# Patient Record
Sex: MALE | Race: WHITE | NOT HISPANIC OR LATINO | Employment: OTHER | URBAN - METROPOLITAN AREA
[De-identification: names, ages, dates, MRNs, and addresses within clinical notes are randomized per-mention and may not be internally consistent; named-entity substitution may affect disease eponyms.]

---

## 2023-06-26 ENCOUNTER — PATIENT OUTREACH (OUTPATIENT)
Dept: PRIMARY CARE | Facility: CLINIC | Age: 68
End: 2023-06-26
Payer: MEDICARE

## 2023-06-26 DIAGNOSIS — J44.1 COPD EXACERBATION (MULTI): ICD-10-CM

## 2023-06-26 RX ORDER — PREDNISONE 20 MG/1
20 TABLET ORAL DAILY
COMMUNITY
End: 2023-07-17 | Stop reason: ALTCHOICE

## 2023-06-26 RX ORDER — IPRATROPIUM BROMIDE 0.5 MG/2.5ML
0.5 SOLUTION RESPIRATORY (INHALATION)
COMMUNITY
End: 2023-07-17 | Stop reason: ALTCHOICE

## 2023-06-26 NOTE — PROGRESS NOTES
Discharge Facility:[facility]  Discharge Diagnosis:COPD Exacerbation   Admission Date:6/19/23  Discharge Date: 6/24/23    PCP Appointment Date:Patient requested to only follow up with Pulmonology  Specialist Appointment Date: Pulmonology  Hospital Encounter and Summary: Linked See discharge assessment below for further details  Engagement  Call Start Time: 1040 (6/26/2023 11:06 AM)    Medications  Medications reviewed with patient/caregiver?: Yes (6/26/2023 11:06 AM)  Is the patient having any side effects they believe may be caused by any medication additions or changes?: No (6/26/2023 11:06 AM)  Does the patient have all medications ordered at discharge?: Yes (Patient stated that he is taking prednisone 20 mg and albuterol inhaler) (6/26/2023 11:06 AM)  Prescription Comments: see med list (6/26/2023 11:06 AM)  Medication Comments: see med list (6/26/2023 11:06 AM)    Appointments  Does the patient have a primary care provider?: Yes (6/26/2023 11:06 AM)  Care Management Interventions: -- (Patient reported following up with Specialty Doctor) (6/26/2023 11:06 AM)  Has the patient kept scheduled appointments due by today?: Yes (6/26/2023 11:06 AM)  Care Management Interventions: Advised to schedule with specialist (patient will follow up with Pulmonology) (6/26/2023 11:06 AM)    Self Management  What Durable Medical Equipment (DME) was ordered?: Oxygen Canister PRN as needed. (6/26/2023 11:06 AM)  Has all Durable Medical Equipment (DME) been delivered?: Yes (6/26/2023 11:06 AM)    Patient Teaching  Does the patient have access to their discharge instructions?: Yes (6/26/2023 11:06 AM)  Care Management Interventions: Reviewed instructions with patient (6/26/2023 11:06 AM)  What is the patient's perception of their health status since discharge?: Improving (6/26/2023 11:06 AM)  Is the patient/caregiver able to teach back the hierarchy of who to call/visit for symptoms/problems? PCP, Specialist, Home Health nurse,  Urgent Care, ED, 911: Yes (6/26/2023 11:06 AM)

## 2023-07-07 DIAGNOSIS — J44.9 CHRONIC OBSTRUCTIVE PULMONARY DISEASE, UNSPECIFIED COPD TYPE (MULTI): Primary | ICD-10-CM

## 2023-07-17 ENCOUNTER — TELEMEDICINE (OUTPATIENT)
Dept: PHARMACY | Facility: HOSPITAL | Age: 68
End: 2023-07-17
Payer: MEDICARE

## 2023-07-17 DIAGNOSIS — J44.9 CHRONIC OBSTRUCTIVE PULMONARY DISEASE, UNSPECIFIED COPD TYPE (MULTI): ICD-10-CM

## 2023-07-17 PROBLEM — M06.9 RHEUMATOID ARTHRITIS (MULTI): Status: ACTIVE | Noted: 2023-07-17

## 2023-07-17 RX ORDER — FLUTICASONE FUROATE, UMECLIDINIUM BROMIDE AND VILANTEROL TRIFENATATE 200; 62.5; 25 UG/1; UG/1; UG/1
1 POWDER RESPIRATORY (INHALATION)
COMMUNITY
Start: 2023-07-06 | End: 2023-10-16 | Stop reason: SDUPTHER

## 2023-07-17 RX ORDER — HYDROXYCHLOROQUINE SULFATE 200 MG/1
1 TABLET, FILM COATED ORAL DAILY
COMMUNITY
Start: 2014-09-09 | End: 2023-12-01

## 2023-07-17 RX ORDER — IPRATROPIUM BROMIDE AND ALBUTEROL SULFATE 2.5; .5 MG/3ML; MG/3ML
3 SOLUTION RESPIRATORY (INHALATION)
COMMUNITY
Start: 2023-06-23

## 2023-07-17 RX ORDER — FOLIC ACID 1 MG/1
1 TABLET ORAL DAILY
COMMUNITY
Start: 2014-04-07

## 2023-07-17 RX ORDER — ALBUTEROL SULFATE 90 UG/1
2 AEROSOL, METERED RESPIRATORY (INHALATION) EVERY 4 HOURS PRN
COMMUNITY
Start: 2022-07-21 | End: 2023-12-01 | Stop reason: SDUPTHER

## 2023-07-17 NOTE — PROGRESS NOTES
Pharmacy Post-Discharge Visit  Felix Mancera is a 68 y.o. male was referred to Clinical Pharmacy Team to complete a post-discharge medication optimization and monitoring visit.  The patient was referred for their COPD.    Admission Date: 6/19/23  Discharge Date: 6/24/23    Referring Provider: Terrence Farley MD    Subjective   No Known Allergies  No Pharmacies Listed    Social History     Social History Narrative    Not on file        Notable Medication changes following discharge:  Start:   Trelegy 200-62.5-25 mcg inhaler 1 puff daily  Duoneb solution 3-4 times daily as needed  Stop:   Trelegy 100-62.5-25 mcg inhaler  Albuterol nebulizer solution 3-4 times daily as needed    HPI  COPD ASSESSMENT    Rescue Inhaler Use:  -How many times per week do you use your rescue inhaler? 3-4x/week (since Trelegy dose increase)  -How many times per week do you use your nebulizer solution? Couple times per week (since Trelegy dose increase)    Inhaler Technique:  -How do you use your rescue inhaler?  --describes appropriate technique; only waits 10 seconds between puffs, advised to wait about 1 min    -How do you use your maintenance inhaler?  --describes appropriate technique, rinses mouth after use    Secondary Prevention (vaccines):  -Influenza: Fall 2023  -COVID: up to date  -PPSV23/PCV20 (unsure which): Fall 2023    Sx Management:  -Increased cough? no  -Increased sputum production? no  -Increased SOB? no    Exacerbation Hx:  -When was your last hospitalization for an exacerbation? 6/19/23  -When was the last time you were treated with antibiotics and/or steroids? 6/19/23 (prednisone given at discharge - finished treatment)    Going to start pulmonary rehab 7/25/23  Saw pulmonologist last week; next appointment 10/2023    Review of Systems    Objective     There were no vitals taken for this visit.     LAB  Lab Results   Component Value Date    BILITOT 0.6 06/19/2023    CALCIUM 9.6 06/24/2023    CO2 34 (H) 06/24/2023     CL 98 06/24/2023    CREATININE 0.78 06/24/2023    GLUCOSE 108 (H) 06/24/2023    ALKPHOS 32 (L) 06/19/2023    K 5.0 06/24/2023    PROT 7.4 06/19/2023     06/24/2023    AST 43 (H) 06/19/2023    ALT 32 06/19/2023    BUN 29 (H) 06/24/2023    ANIONGAP 13 06/24/2023    MG 2.33 06/24/2023    PHOS 3.6 06/24/2023    ALBUMIN 3.5 06/24/2023    GFRMALE >90 06/24/2023     Lab Results   Component Value Date    TRIG 90 06/08/2022    CHOL 236 (H) 06/08/2022    HDL 56.8 06/08/2022     Lab Results   Component Value Date    HGBA1C 5.9 (A) 06/20/2022         Current Outpatient Medications on File Prior to Visit   Medication Sig Dispense Refill    albuterol 90 mcg/actuation inhaler Inhale 2 puffs every 4 hours if needed.      folic acid (Folvite) 1 mg tablet Take 1 tablet (1 mg) by mouth once daily.      hydroxychloroquine (Plaquenil) 200 mg tablet Take 1 tablet (200 mg) by mouth once daily.      ipratropium-albuteroL (Duo-Neb) 0.5-2.5 mg/3 mL nebulizer solution Take 3 mL by nebulization 4 times a day. As needed      Trelegy Ellipta 200-62.5-25 mcg blister with device Inhale 1 puff once daily.      [DISCONTINUED] ipratropium (Atrovent) 0.02 % nebulizer solution Take 0.5 mg by nebulization 4 times a day.      [DISCONTINUED] predniSONE (Deltasone) 20 mg tablet Take 1 tablet (20 mg) by mouth once daily.       No current facility-administered medications on file prior to visit.        HISTORICAL PHARMACOTHERAPY  -Stiolto 2.5-2.5 mcg 2 puffs once daily  -Trelegy 100-62.5-25 mcg one puff once daily  -Daliresp 250 mcg one tablet once daily    DRUG INTERATIONS  - none    Assessment/Plan   Problem List Items Addressed This Visit       COPD (chronic obstructive pulmonary disease) (CMS/[facility])     COPD well controlled since discharge ~3 weeks ago. Using albuterol rescue inhaler 3-4 times/week and duoneb only a couple times a week as needed. Goal albuterol use <2x/day for COPD  Continue Trelegy 200-62.5-25 mcg one puff once daily, duoneb 3 ml  as needed, and albuterol rescue inhaler as needed.  Inhaler technique assessed & appropriate  Education provided: Educated patient to wait 1 minute between albuterol puffs to allow max efficacy of first puff. Also explained risk of albuterol overuse and why it's important to only use albuterol when needed  Follow-up with PCP for yearly exam, pulmonology in October, and pulmonary rehab next week.  Pharmacy follow-up not necessary            Continue all meds under the continuation of care with the referring provider and clinical pharmacy team.    Agustina Chua, PharmD     Verbal consent to manage patient's drug therapy was obtained from [the patient and/or an individual authorized to act on behalf of a patient]. They were informed they may decline to participate or withdraw from participation in pharmacy services at any time.

## 2023-07-17 NOTE — ASSESSMENT & PLAN NOTE
COPD well controlled since discharge ~3 weeks ago. Using albuterol rescue inhaler 3-4 times/week and duoneb only a couple times a week as needed. Goal albuterol use <2x/day for COPD  Continue Trelegy 200-62.5-25 mcg one puff once daily, duoneb 3 ml as needed, and albuterol rescue inhaler as needed.  Inhaler technique assessed & appropriate  Education provided: Educated patient to wait 1 minute between albuterol puffs to allow max efficacy of first puff. Also explained risk of albuterol overuse and why it's important to only use albuterol when needed  Follow-up with PCP for yearly exam, pulmonology in October, and pulmonary rehab next week.  Pharmacy follow-up not necessary

## 2023-09-08 PROBLEM — M54.50 LUMBAR PAIN: Status: ACTIVE | Noted: 2023-09-08

## 2023-09-08 PROBLEM — B35.6 TINEA CRURIS: Status: ACTIVE | Noted: 2023-09-08

## 2023-09-08 PROBLEM — R91.8 LUNG NODULES: Status: ACTIVE | Noted: 2023-09-08

## 2023-09-08 PROBLEM — M48.02 CERVICAL SPINAL STENOSIS: Status: ACTIVE | Noted: 2023-09-08

## 2023-09-08 PROBLEM — R29.2 HOFFMAN SIGN PRESENT: Status: ACTIVE | Noted: 2023-09-08

## 2023-09-08 PROBLEM — M54.12 CERVICAL MYELOPATHY WITH CERVICAL RADICULOPATHY (MULTI): Status: ACTIVE | Noted: 2023-09-08

## 2023-09-08 PROBLEM — J42 CHRONIC BRONCHITIS (MULTI): Status: ACTIVE | Noted: 2023-09-08

## 2023-09-08 PROBLEM — G89.29 CHRONIC PAIN OF BOTH SHOULDERS: Status: ACTIVE | Noted: 2023-09-08

## 2023-09-08 PROBLEM — F17.201 TOBACCO ABUSE, IN REMISSION: Status: ACTIVE | Noted: 2023-09-08

## 2023-09-08 PROBLEM — M54.16 ACUTE LUMBAR RADICULOPATHY: Status: ACTIVE | Noted: 2023-09-08

## 2023-09-08 PROBLEM — R06.09 CHRONIC DYSPNEA: Status: ACTIVE | Noted: 2023-09-08

## 2023-09-08 PROBLEM — G95.9 CERVICAL MYELOPATHY WITH CERVICAL RADICULOPATHY (MULTI): Status: ACTIVE | Noted: 2023-09-08

## 2023-09-08 PROBLEM — E78.5 HLD (HYPERLIPIDEMIA): Status: ACTIVE | Noted: 2023-09-08

## 2023-09-08 PROBLEM — M25.512 CHRONIC PAIN OF BOTH SHOULDERS: Status: ACTIVE | Noted: 2023-09-08

## 2023-09-08 PROBLEM — H25.811 COMBINED FORM OF AGE-RELATED CATARACT, RIGHT EYE: Status: ACTIVE | Noted: 2023-09-08

## 2023-09-08 PROBLEM — M75.100 ROTATOR CUFF TEAR: Status: ACTIVE | Noted: 2023-09-08

## 2023-09-08 PROBLEM — G95.20 CERVICAL SPINAL CORD COMPRESSION (MULTI): Status: ACTIVE | Noted: 2023-09-08

## 2023-09-08 PROBLEM — R63.4 UNEXPLAINED WEIGHT LOSS: Status: ACTIVE | Noted: 2023-09-08

## 2023-09-08 PROBLEM — M25.511 CHRONIC PAIN OF BOTH SHOULDERS: Status: ACTIVE | Noted: 2023-09-08

## 2023-09-08 PROBLEM — H52.4 HYPEROPIA WITH PRESBYOPIA: Status: ACTIVE | Noted: 2023-09-08

## 2023-09-08 PROBLEM — H52.00 HYPEROPIA WITH PRESBYOPIA: Status: ACTIVE | Noted: 2023-09-08

## 2023-09-08 RX ORDER — IPRATROPIUM BROMIDE 0.5 MG/2.5ML
SOLUTION RESPIRATORY (INHALATION)
COMMUNITY
Start: 2023-07-06 | End: 2024-03-11 | Stop reason: SDUPTHER

## 2023-09-08 RX ORDER — GABAPENTIN 800 MG/1
TABLET ORAL
COMMUNITY
End: 2023-11-09 | Stop reason: WASHOUT

## 2023-09-08 RX ORDER — BUDESONIDE 0.5 MG/2ML
INHALANT ORAL 2 TIMES DAILY
COMMUNITY
Start: 2022-10-13

## 2023-09-08 RX ORDER — TIOTROPIUM BROMIDE AND OLODATEROL 3.124; 2.736 UG/1; UG/1
2 SPRAY, METERED RESPIRATORY (INHALATION) DAILY
COMMUNITY
Start: 2022-12-15 | End: 2023-11-09 | Stop reason: WASHOUT

## 2023-09-08 RX ORDER — DULOXETIN HYDROCHLORIDE 60 MG/1
1 CAPSULE, DELAYED RELEASE ORAL NIGHTLY
COMMUNITY
End: 2023-11-09 | Stop reason: WASHOUT

## 2023-09-08 RX ORDER — ROFLUMILAST 250 UG/1
1 TABLET ORAL DAILY
COMMUNITY
Start: 2022-10-13 | End: 2023-11-09 | Stop reason: WASHOUT

## 2023-09-25 ENCOUNTER — PATIENT OUTREACH (OUTPATIENT)
Dept: PRIMARY CARE | Facility: CLINIC | Age: 68
End: 2023-09-25
Payer: MEDICARE

## 2023-10-03 ENCOUNTER — CLINICAL SUPPORT (OUTPATIENT)
Dept: CARDIAC REHAB | Facility: CLINIC | Age: 68
End: 2023-10-03
Payer: MEDICARE

## 2023-10-03 ENCOUNTER — APPOINTMENT (OUTPATIENT)
Dept: CARDIAC REHAB | Facility: CLINIC | Age: 68
End: 2023-10-03
Payer: MEDICARE

## 2023-10-03 DIAGNOSIS — J44.89 OBSTRUCTIVE CHRONIC BRONCHITIS WITHOUT EXACERBATION (MULTI): ICD-10-CM

## 2023-10-03 PROCEDURE — 94626 PHY/QHP OP PULM RHB W/MNTR: CPT | Performed by: INTERNAL MEDICINE

## 2023-10-05 ENCOUNTER — CLINICAL SUPPORT (OUTPATIENT)
Dept: CARDIAC REHAB | Facility: CLINIC | Age: 68
End: 2023-10-05
Payer: MEDICARE

## 2023-10-05 DIAGNOSIS — J44.9 CHRONIC OBSTRUCTIVE PULMONARY DISEASE, UNSPECIFIED COPD TYPE (MULTI): Primary | ICD-10-CM

## 2023-10-05 PROCEDURE — 94626 PHY/QHP OP PULM RHB W/MNTR: CPT | Performed by: INTERNAL MEDICINE

## 2023-10-10 ENCOUNTER — CLINICAL SUPPORT (OUTPATIENT)
Dept: CARDIAC REHAB | Facility: CLINIC | Age: 68
End: 2023-10-10
Payer: MEDICARE

## 2023-10-10 ENCOUNTER — APPOINTMENT (OUTPATIENT)
Dept: CARDIAC REHAB | Facility: CLINIC | Age: 68
End: 2023-10-10
Payer: MEDICARE

## 2023-10-10 DIAGNOSIS — J44.9 CHRONIC OBSTRUCTIVE PULMONARY DISEASE, UNSPECIFIED COPD TYPE (MULTI): Primary | ICD-10-CM

## 2023-10-10 PROCEDURE — 94626 PHY/QHP OP PULM RHB W/MNTR: CPT | Performed by: INTERNAL MEDICINE

## 2023-10-11 ASSESSMENT — ENCOUNTER SYMPTOMS
FREQUENT THROAT CLEARING: 1
SHORTNESS OF BREATH: 1
COUGH: 1

## 2023-10-12 ENCOUNTER — CLINICAL SUPPORT (OUTPATIENT)
Dept: CARDIAC REHAB | Facility: CLINIC | Age: 68
End: 2023-10-12
Payer: MEDICARE

## 2023-10-12 ENCOUNTER — OFFICE VISIT (OUTPATIENT)
Dept: PULMONOLOGY | Facility: CLINIC | Age: 68
End: 2023-10-12
Payer: MEDICARE

## 2023-10-12 VITALS
SYSTOLIC BLOOD PRESSURE: 114 MMHG | HEIGHT: 68 IN | TEMPERATURE: 96.8 F | HEART RATE: 80 BPM | WEIGHT: 162.4 LBS | BODY MASS INDEX: 24.61 KG/M2 | DIASTOLIC BLOOD PRESSURE: 76 MMHG | RESPIRATION RATE: 18 BRPM

## 2023-10-12 DIAGNOSIS — J43.9 PULMONARY EMPHYSEMA, UNSPECIFIED EMPHYSEMA TYPE (MULTI): Primary | ICD-10-CM

## 2023-10-12 DIAGNOSIS — J44.9 CHRONIC OBSTRUCTIVE PULMONARY DISEASE, UNSPECIFIED COPD TYPE (MULTI): Primary | ICD-10-CM

## 2023-10-12 DIAGNOSIS — Z87.891 HISTORY OF NICOTINE DEPENDENCE: ICD-10-CM

## 2023-10-12 PROCEDURE — 1126F AMNT PAIN NOTED NONE PRSNT: CPT | Performed by: STUDENT IN AN ORGANIZED HEALTH CARE EDUCATION/TRAINING PROGRAM

## 2023-10-12 PROCEDURE — 94626 PHY/QHP OP PULM RHB W/MNTR: CPT | Performed by: INTERNAL MEDICINE

## 2023-10-12 PROCEDURE — 1159F MED LIST DOCD IN RCRD: CPT | Performed by: STUDENT IN AN ORGANIZED HEALTH CARE EDUCATION/TRAINING PROGRAM

## 2023-10-12 PROCEDURE — 99214 OFFICE O/P EST MOD 30 MIN: CPT | Performed by: STUDENT IN AN ORGANIZED HEALTH CARE EDUCATION/TRAINING PROGRAM

## 2023-10-12 ASSESSMENT — ENCOUNTER SYMPTOMS
SHORTNESS OF BREATH: 1
ENDOCRINE NEGATIVE: 1
GASTROINTESTINAL NEGATIVE: 1
CARDIOVASCULAR NEGATIVE: 1
COUGH: 1
EYES NEGATIVE: 1
CONSTITUTIONAL NEGATIVE: 1

## 2023-10-12 NOTE — PROGRESS NOTES
Department of Medicine I Division of Pulmonary, Critical Care, and Sleep Medicine   [address] [place] [address]  Phone: [phone]  Fax: [phone]    History of Present Illness   Felix Mancera is a 68 y.o. male presenting for follow up of COPD.    Referred by:  No ref. provider found, for COPD. I have independently interviewed and examined the patient in the office and reviewed available records.       Pulmonology Questionnaire (Submitted on 10/11/2023)  Chief Complaint: Primary symptoms  Do you experience frequent throat clearing?: Yes  Chronicity: chronic  When did you first notice your symptoms?: more than 1 year ago  How often do your symptoms occur?: intermittently  Since you first noticed this problem, how has it changed?: gradually improving  Which of the following makes your symptoms worse?: climbing stairs, strenuous activity, URI  Which of the following makes your symptoms better?: ipratropium    Has issues when he goes to his son's house in [place], [place]   Started pulmonary rehab a few months ago  Triggers: humid air,   Pulmonary medications: trelegy, duoneb (once a week)    Last visit June 2023     Since last visit on 5/4/23 with Dr. Jung, patient unfortunately had a repeat hospital admission for COPD exacerbation from 6/19 through 6/24/2023. He had presented with significant shortness of breath for 3 days and required BiPAP in the ED; briefly on Precedex and admitted to the MICU. He was transitioned to supplemental O2 and improved with his ABGs. There was low concern for bacterial pneumonia. Was eventually transitioned down to 2 L nasal cannula and transferred to the floor. Was continued on a steroid course extended to 7 days. Was referred to pulmonary rehab (Per chart review, patient had refused pulmonary rehab previously). Was previously advised Roflumilast as part of a study (but was told it wouldn't’t be paid for and he didn't want to start paying for  it himself). He never ended up starting on it. States he will be starting up pulmonary rehab soon. He has oxygen at home; which was originally given to him for some mild nocturnal hypoxia. Tried it for a couple months; stopped using it. He checks his SpO2 at home periodically at rest as well as after exertion. Can drop as low as 88-90%. Not putting oxygen on during the day. Using Trelegy 100 daily. Was hardly using his albuterol prior to going into the hospital. After coming home after this recent exacerbation, has only needed it about 1-2x. He has a chronic cough - worse in the AM. Mildly productive with white/clear sputum. Admits to fairly frequent wheezing. RAY is fairly significant - he can feel winded moving around his house. Denies SOB at rest. Has intermittent runny nose - takes OTC Mucinex (I advised switching to Cetirizine). Denies GERD. Denies Chest pain, palpitations, lower leg edema.  CAT Today: 17        Last visit 1/2023: started trelegy       #COPD  No alpha 1 antitrypsin testing done since his last visit, reminded patient today.   Mr. Mancera has started using trelegy and has no complaints with it. He has started working back at the [employer] with some work modifications and is very happy to be back working. He has only had to stop occasionally and otherwise modifies his work for his shoulder pain.   He had a cold 1 month ago but did not require any steroids or physician evaluation. He did use his albuterol a little during that time.   He does have a cough in the AM and seldom wheezes. He cites living in an old felicity house as part of the triggers for his symptoms.   He is not interested in pulmonary rehab.     #Nocturnal hypoxia  He was prescribed nocturnal oxygen but hates it and stopped using i. He reportst that he sleeps well, with no snoring, or daytime fatigue.   He is not interested in a sleep medicine referral.       Past Medical History: COPD, RA, HLD, tobacco use  Surgical History: C  spine  Social History: former smoker 35 pack years, quit 2012, [place],   Family History: father - COPD, emphysema, grandfather - emphysema    6MWT 326 m, 63%  PFT 1/5/23: FVC 91%, FEV 34%, ratio 28, %, gas trapping present, DLCO 50  Review of Systems  Review of Systems   Constitutional: Negative.    HENT: Negative.     Eyes: Negative.    Respiratory:  Positive for cough and shortness of breath.    Cardiovascular: Negative.    Gastrointestinal: Negative.    Endocrine: Negative.    Genitourinary: Negative.    Musculoskeletal:         + joint pains     All other review of systems are negative and/or non-contributory.    Past Medical History   He has a past medical history of Other specified health status.    Immunizations     Immunization History   Administered Date(s) Administered    Flu vaccine, quadrivalent, high-dose, preservative free, age 65y+ (FLUZONE) 10/13/2022    Moderna COVID-19 vaccine, bivalent, blue cap/gray label *Check age/dose* 10/17/2022    Moderna SARS-CoV-2 Vaccination 03/06/2021, 04/20/2022    Pfizer Purple Cap SARS-CoV-2 10/30/2021    Pneumococcal conjugate vaccine, 20-valent (PREVNAR 20) 06/08/2022    Pneumococcal polysaccharide vaccine, 23-valent, age 2 years and older (PNEUMOVAX 23) 08/03/2020, 01/05/2023    Tdap vaccine, age 7 year and older (BOOSTRIX) 05/28/2011, 01/26/2022       Medications and Allergies     Current Outpatient Medications   Medication Instructions    albuterol 90 mcg/actuation inhaler 2 puffs, inhalation, Every 4 hours PRN    budesonide (Pulmicort) 0.5 mg/2 mL nebulizer solution inhalation, 2 times daily    DULoxetine (Cymbalta) 60 mg DR capsule 1 capsule, oral, Nightly    folic acid (Folvite) 1 mg tablet 1 tablet, oral, Daily    gabapentin (Neurontin) 800 mg tablet oral    hydroxychloroquine (Plaquenil) 200 mg tablet 1 tablet, oral, Daily    ipratropium (Atrovent) 0.02 % nebulizer solution inhalation    ipratropium-albuteroL (Duo-Neb) 0.5-2.5 mg/3  "mL nebulizer solution 3 mL, nebulization, 4 times daily RT, As needed    multivitamin with iron (DAILY MULTIPLE VITAMINS/IRON ORAL) 1 tablet, oral, Daily    NON FORMULARY Patient with h/o advanced COPD, currently requiring supplemental oxygen,requires home nebulizzer for administration of bronchodilators ( Albuterol solution), for rescue therapy. I attest that this is a medically necessary treatment.    roflumilast (Daliresp) 250 mcg tablet 1 tablet, oral, Daily    Stiolto Respimat 2.5-2.5 mcg/actuation mist inhaler 2 puffs, inhalation, Daily    Trelegy Ellipta 200-62.5-25 mcg blister with device 1 puff, inhalation, Daily RT      Other    Social History   He has no history on file for tobacco use, alcohol use, and drug use.    Smoking History: see HPI   Exposure/Job History: see HPI     Family History     Family History   Problem Relation Name Age of Onset    Other (AMD) Mother      Hypertension Mother             Surgical History   He has a past surgical history that includes Other surgical history (01/28/2021).  Cervical spine surgery    Physical Exam   There were no vitals taken for this visit.     Physical Exam  Constitutional:       Appearance: Normal appearance.   HENT:      Head: Normocephalic and atraumatic.   Eyes:      Pupils: Pupils are equal, round, and reactive to light.   Cardiovascular:      Rate and Rhythm: Normal rate and regular rhythm.   Pulmonary:      Effort: Pulmonary effort is normal.      Breath sounds: Normal breath sounds.   Skin:     Findings: No rash.   Neurological:      General: No focal deficit present.      Mental Status: He is alert and oriented to person, place, and time.   Psychiatric:         Mood and Affect: Mood normal.          Results   Pulmonary Function Tests:      No results found for: \"FEV1\", \"OGU8ZICL\"    Chest Radiograph:  XR chest 1 view 06/20/2023    Narrative  Interpreted By:  LIYAH PINO MD and GILDARDO WAY DO  MRN: [id number]  Patient Name: ROSALEE" KIMBER    STUDY:  CHEST 1 VIEW;  6/20/2023 11:05 am    INDICATION:  dyspnea, oxygen requirement .    COMPARISON:  Chest radiograph 06/19/2023 CT chest 08/12/2022.    ACCESSION NUMBER(S):  [id number]    ORDERING CLINICIAN:  CHAGO JAMESON    FINDINGS:  AP radiograph of the chest was provided.    CARDIOMEDIASTINAL SILHOUETTE:  Cardiomediastinal silhouette is normal in size and configuration.    LUNGS:  Redemonstration of hyperinflated lungs with diffuse coarsened  interstitial markings, similar to prior. Bibasilar streaky opacities  favored to represent atelectasis. No new focal consolidation, pleural  effusion, pneumothorax.    ABDOMEN:  No remarkable upper abdominal findings.    BONES:  Remote rib fractures are again seen. Otherwise, no acute osseous  pathology.    Impression  Mild bibasilar atelectasis superimposed on chronic lung changes.  Otherwise, no new focal consolidation, pleural effusion, or  pneumothorax.    I personally reviewed the images/study and I agree with the findings  as stated above by resident physician, Dr. Daniel Schreiber. The  study was interpreted at [facility] in [facility].      Chest CT Scan:  CT lung screening low dose 09/01/2023    Narrative  Interpreted By:  ENRICO GROSSMAN MD  MRN: [id number]  Patient Name: KIMBER TURK    STUDY:  TH CT CHEST LOW DOSE FOR LUNG SCREENING WO CONTRAST;  9/1/2023 3:23 pm    INDICATION:  cough, dyspnea on exertion  R91.8: Lung nodules.    COMPARISON:  08/12/2022.    ACCESSION NUMBER(S):  [id number]    ORDERING CLINICIAN:  SARITHA VALDES    TECHNIQUE:  Helical data acquisition of the chest was obtained without IV  contrast material.  Images were reformatted in axial, coronal, and  sagittal planes.    FINDINGS:  LUNGS AND AIRWAYS:  The trachea and central airways are patent. No endobronchial lesion  is seen.    There is severe emphysematous changes. There is diffuse peribronchial  thickening.There is no focal  consolidation, pleural effusion, or  pneumothorax.    There is a 3 mm right lower lobe parenchymal lung nodule unchanged.  There are no suspicious parenchymal lung nodules.    MEDIASTINUM AND ZACH, LOWER NECK AND AXILLA:  The visualized thyroid gland is within normal limits.  Scattered mediastinal lymph nodes are felt to be reactive in nature  Esophagus appears within normal limits as seen.    HEART AND VESSELS:  There is mild atherosclerotic changes of the thoracic aorta.  Main pulmonary artery and its branches are normal in caliber.  There is mild coronary artery calcifications.  The cardiac chambers are not enlarged.  There is no pericardial effusion seen.    UPPER ABDOMEN:  The visualized subdiaphragmatic structures demonstrate no remarkable  findings.        CHEST WALL AND OSSEOUS STRUCTURES:  Chest wall is within normal limits.  No acute osseous pathology.There are no suspicious osseous lesions.    Impression  1. There are no suspicious parenchymal lung nodules. Recommend  continued 1 year low-dose chest CT for surveillance.        LUNG RADS CATEGORY:  Lung-RADS 2 (Benign Appearance or Indolent behavior): Continue with  annual screening in 12 months,  CT Chest Lung Ca Screen Initial  or Follow up LR1/LR2/Continuation of Screening Low Dose recommended  as per American College of Radiology Guidelines Lung-RADS Version  2022.       ECHO:  Echocardiogram     [facility], [address] [place] [address]  Tel [phone] and Fax [phone]    TRANSTHORACIC ECHOCARDIOGRAM REPORT      Patient Name:     KIMBER Godoy Physician:  [id number] GALLEGOS  Study Date:       12/12/2022          Referring           SARITHA VALDES  Physician:  MRN/PID:          [id number]            PCP:                Terrence Farley MD  Accession/Order#: [id number]        [facility]  Location:           Invasive  YOB: 1955             Fellow:  Gender:           M                   Nurse:  Admit Date:                           Sonographer:        Graciela Willson [facility]  Admission Status: Outpatient          Additional Staff:  Height:           172.72 cm           CC Report to:  Weight:           72.57 kg            Study Type:         Echocardiogram  BSA:              1.86 m2  Blood Pressure: 115 /77 mmHg    Diagnosis/ICD: R06.00-Dyspnea, unspecified  Indication:    Chronic dyspnea; COPD; RAY  Procedure/CPT: Echo Complete w Full Doppler-[id number]    Patient History:  Smoker:            Former.  Pertinent History: Dyspnea and COPD.    Study Detail: The following Echo studies were performed: 2D, M-Mode, Doppler and  color flow. Technically challenging study due to prominent lung  artifact.      PHYSICIAN INTERPRETATION:  Left Ventricle: The left ventricular systolic function is normal. There are no regional wall motion abnormalities. The left ventricular cavity size is normal. Left ventricular diastolic filling was indeterminate.  Left Atrium: The left atrium was not well visualized.  Right Ventricle: The right ventricle is normal in size. There is normal right ventricular global systolic function.  Right Atrium: The right atrium is normal in size.  Aortic Valve: The aortic valve was not well visualized. There is indeterminate aortic valve regurgitation. The peak instantaneous gradient of the aortic valve is 5.3 mmHg.  Mitral Valve: The mitral valve is normal in structure. There is trace mitral valve regurgitation.  Tricuspid Valve: The tricuspid valve was not well visualized. There is trace tricuspid regurgitation. The right ventricular systolic pressure is unable to be estimated.  Pulmonic Valve: The pulmonic valve is structurally normal. There is no indication of pulmonic valve regurgitation.  Pericardium: There is no pericardial effusion noted.  Aorta: The aortic root was not well visualized.  Systemic Veins: The inferior vena cava size appears  small.  In comparison to the previous echocardiogram(s): There are no prior studies on this patient for comparison purposes.      CONCLUSIONS:  1. Left ventricular systolic function is normal.  2. Poorly visualized anatomical structures due to suboptimal image quality.    QUANTITATIVE DATA SUMMARY:  LA VOLUME:  Normal Ranges:  LA Vol A4C:        17.2 ml    (22+/-6mL/m2)  LA Vol A2C:        21.1 ml  LA Vol BP:         19.5 ml  LA Vol Index A4C:  9.3 ml/m2  LA Vol Index A2C:  11.3 ml/m2  LA Vol Index BP:   10.5 ml/m2  LA Area A4C:       9.0 cm2  LA Area A2C:       10.2 cm2  LA Major Axis A4C: 4.0 cm  LA Major Axis A2C: 4.2 cm  LA Volume Index:   10.5 ml/m2  LA Vol A4C:        15.3 ml  LA Vol A2C:        20.3 ml    M-MODE MEASUREMENTS:  Normal Ranges:  Ao Root: 3.10 cm (2.0-3.7cm)    LV SYSTOLIC FUNCTION BY 2D PLANIMETRY (MOD):  Normal Ranges:  EF-A4C View: 58.0 % (>=55%)  EF-A2C View: 62.6 %  EF-Biplane:  60.0 %    LV DIASTOLIC FUNCTION:  Normal Ranges:  MV Peak E:        0.78 m/s    (0.7-1.2 m/s)  MV Peak A:        0.72 m/s    (0.42-0.7 m/s)  E/A Ratio:        1.08        (1.0-2.2)  MV lateral e'     0.07 m/s  MV medial e'      0.06 m/s  MV A Dur:         106.11 msec  PulmV Sys Ze:    53.95 cm/s  PulmV Mckeon Ze:   37.44 cm/s  PulmV S/D Ze:    1.44  PulmV A Revs Ze: 30.86 cm/s  PulmV A Revs Dur: 85.35 msec    MITRAL VALVE:  Normal Ranges:  MV Vmax:    0.91 m/s (<=1.3m/s)  MV peak PG: 3.3 mmHg (<5mmHg)  MV mean P.5 mmHg (<2mmHg)  MV VTI:     18.81 cm (10-13cm)  MV DT:      215 msec (150-240msec)    AORTIC VALVE:  Normal Ranges:  AoV Vmax:      1.15 m/s (<=1.7m/s)  AoV Peak P.3 mmHg (<20mmHg)  LVOT Max Ze:  0.93 m/s (<=1.1m/s)  LVOT VTI:      17.31 cm  LVOT Diameter: 1.96 cm  (1.8-2.4cm)  AoV Area,Vmax: 2.44 cm2 (2.5-4.5cm2)    RIGHT VENTRICLE:  RV 1   2.7 cm  RV 2   2.2 cm  RV 3   6.0 cm  TAPSE: 21.0 mm    TRICUSPID VALVE/RVSP:  Normal Ranges:  Est. RA Pressure: 3 mmHg    PULMONIC VALVE:  Normal  "Ranges:  PV Accel Time: 104 msec (>120ms)  PV Max Ze:    1.0 m/s  (0.6-0.9m/s)  PV Max P.0 mmHg    Pulmonary Veins:  PulmV A Revs Dur: 85.35 msec  PulmV A Revs Ze: 30.86 cm/s  PulmV Mckeon Ze:   37.44 cm/s  PulmV S/D Ze:    1.44  PulmV Sys Ze:    53.95 cm/s      [id number] Bienvenido Valero DO  Electronically signed on 2022 at 5:25:10 PM        Final        Labs:  Lab Results   Component Value Date    WBC 10.7 2023    HGB 15.7 2023    HCT 47.0 2023    MCV 89 2023     2023       Lab Results   Component Value Date    CREATININE 0.78 2023    BUN 29 (H) 2023     2023    K 5.0 2023    CL 98 2023    CO2 34 (H) 2023        Lab Results   Component Value Date    SHAHNAZ NEGATIVE 10/01/2019    RF 11 10/01/2018    SEDRATE 7 2019        IgE: No results found for: \"IGE\"   Respiratory Allergy Panel:  AEC:   Eosinophils Absolute (x10E9/L)   Date Value   2023 0.04   2023 0.08     Eosinophils % (%)   Date Value   2023 0.4   2023 0.9       Cultures:  No results found for: \"AFBCX\"   No results found for: \"RESPCULTCYFI\"    No results found for the last 90 days.  C     Assessment and Plan        1. Severe COPD: GOLD 3E COPD--appears to have some environmental trigger--may be related to an exposure at his son's house in [place] or a virus he may have picked up while traveling/from [place]  - continue Trelegy 200 daily  - continue duoneb prn   - Continue albuterol HFA PRN  - at this time since there seems to be a temporal relationship between patient traveling to [place] and his COPD exacerbations would defer chronic zithromax or roflumilast--if has another exacerbation would recommend to discuss starting roflumilast (avoid zithromax since pt is already on hydroxychloriquine for RA). Advised pt to discuss with his family in [place] if there is some potential mold exposure in the home. Other consideration would be component " of environmental allergies to ry that is common in [place] and/or respiratory virus   -We can also consider checking immunoglobulins if has repeated infections   -continue pulmonary rehab   -A1AT testing         Lung Nodules  -most recent scan 9/2023 with stable 3mm lung nodules  -recommend annual LDCT chest for lung cancer screening--next due in September 2024             Follow-up:  6 months or sooner if needed     Xiomara Guevara MD  10/12/2023

## 2023-10-12 NOTE — PATIENT INSTRUCTIONS
Thank you for visiting the Pulmonary Clinic today.   Your breathing medications: continue trelegy, continue duonebs   Tests: annual CT scan of the lungs (due September 2024), blood work to check for genetic cause of emphysema   Vaccines: stay up to date on vaccinations   If you are planning any trips at high altitude we will need to do a special test if you need oxygen at high altitude   Return in 6 months or sooner if needed   If you have questions or concerns, call [phone] (option 4)

## 2023-10-16 DIAGNOSIS — Z00.00 HEALTHCARE MAINTENANCE: ICD-10-CM

## 2023-10-16 RX ORDER — FLUTICASONE FUROATE, UMECLIDINIUM BROMIDE AND VILANTEROL TRIFENATATE 200; 62.5; 25 UG/1; UG/1; UG/1
1 POWDER RESPIRATORY (INHALATION)
Qty: 28 EACH | Refills: 3 | Status: SHIPPED | OUTPATIENT
Start: 2023-10-16 | End: 2023-11-14 | Stop reason: SDUPTHER

## 2023-10-17 ENCOUNTER — CLINICAL SUPPORT (OUTPATIENT)
Dept: CARDIAC REHAB | Facility: CLINIC | Age: 68
End: 2023-10-17
Payer: MEDICARE

## 2023-10-17 ENCOUNTER — APPOINTMENT (OUTPATIENT)
Dept: CARDIAC REHAB | Facility: CLINIC | Age: 68
End: 2023-10-17
Payer: MEDICARE

## 2023-10-17 DIAGNOSIS — J44.9 CHRONIC OBSTRUCTIVE PULMONARY DISEASE, UNSPECIFIED COPD TYPE (MULTI): Primary | ICD-10-CM

## 2023-10-17 PROCEDURE — 94626 PHY/QHP OP PULM RHB W/MNTR: CPT | Performed by: INTERNAL MEDICINE

## 2023-10-19 ENCOUNTER — CLINICAL SUPPORT (OUTPATIENT)
Dept: CARDIAC REHAB | Facility: CLINIC | Age: 68
End: 2023-10-19
Payer: MEDICARE

## 2023-10-19 DIAGNOSIS — J44.9 CHRONIC OBSTRUCTIVE PULMONARY DISEASE, UNSPECIFIED COPD TYPE (MULTI): Primary | ICD-10-CM

## 2023-10-19 PROCEDURE — 94626 PHY/QHP OP PULM RHB W/MNTR: CPT | Performed by: INTERNAL MEDICINE

## 2023-10-23 NOTE — PROGRESS NOTES
"Pulmonary Rehabilitation Assessment    Name: Felix Mancera  Medical Record Number: [id number]  YOB: 1955    Today’s Date: 10/23/2023  Primary Care Physician: Terrence Farley MD  Referring Physician: Alexandria Jung MD    Program Location: [facility]     General  Primary Diagnosis: J44.9  Date of Diagnosis: 1/15/23    Session #: 25    Oxygen Assessment    SP02 rest: 95 %  SP02 exertion: 93 %          Home Pulse Oximeter?: Yes      MMRC Survey score:  Intake: 2  Discharge: NA    Goal Status: In progress  Oxygen Goals: To decrease MMRC score  Oxygen Interventions: To maintain oxygen saturation of >88%      Psychosocial Assessment    Pt reported/currently experiencing:  Pt reported low stress level  Social Support: Yes, Whom:Family  PHQ-9 Survey Score:   Intake: 1  Discharge: NA    CAT Survey Score:   Intake: 13  Discharge: NA    Learning assessment/barriers: None          Goal Status: In progress  Psychosocial Goals: Decrease CAT score   Psychosocial Interventions/Education: To     Nutrition Assessment:    Current Dietary Guidelines: No restrictions  Barriers to dietary change: No     Diet Habit Survey: drop down selection of Picture Your Plate,    Plate:    Intake: 62  Discharge: NA    Weight Management    Height: 5'8\"  Weight: 162lb  BMI: 24.69   Goal Status: In progress  Nutrition Goals: To maintain weight  Nutrition Interventions/Education: 8/22/23 label reading    Exercise Assessment  Current Home Exercise: No     6-minute Walk Assessment:  Intake: 6-MWT distance (meters):305     FiO2: RA      SPO2:92%  Discharge: 6-MWT distance (meters):      FiO2:     SPO2:    Exercise Prescription:    Based on: {6-min walk test   Frequency:  2 days/week   Mode: Treadmill and NuStep   Duration: 45 total aerobic minutes   Intensity: RPE 10-12       Target -140       SpO2 Range 88 to 100 %           Modality METS Load Duration   1 Pre-Exercise   2-5   2 Treadmill  1.9 1.1 15   3 Nustep " 4000  2.6 2 30   4 Recumbent Cycle       5 Weights       6 Post-Exercise   2-5     Resistance Training: No   Home Exercise Prescription given: No   Goal Status: In progress  Exercise Goals: To start home exercise program       Other Core Components/Risk Factor Assessment:    Medication adherence:  Current Medications:    Current Outpatient Medications   Medication Sig Dispense Refill    albuterol 90 mcg/actuation inhaler Inhale 2 puffs every 4 hours if needed.      budesonide (Pulmicort) 0.5 mg/2 mL nebulizer solution Inhale twice a day.      DULoxetine (Cymbalta) 60 mg DR capsule Take 1 capsule (60 mg) by mouth once daily at bedtime.      folic acid (Folvite) 1 mg tablet Take 1 tablet (1 mg) by mouth once daily.      gabapentin (Neurontin) 800 mg tablet Take by mouth.      hydroxychloroquine (Plaquenil) 200 mg tablet Take 1 tablet (200 mg) by mouth once daily.      ipratropium (Atrovent) 0.02 % nebulizer solution Inhale.      ipratropium-albuteroL (Duo-Neb) 0.5-2.5 mg/3 mL nebulizer solution Take 3 mL by nebulization 4 times a day. As needed      multivitamin with iron (DAILY MULTIPLE VITAMINS/IRON ORAL) Take 1 tablet by mouth once daily.      NON FORMULARY Patient with h/o advanced COPD, currently requiring supplemental oxygen,requires home nebulizzer for administration of bronchodilators ( Albuterol solution), for rescue therapy. I attest that this is a medically necessary treatment.      roflumilast (Daliresp) 250 mcg tablet Take 1 tablet (250 mcg) by mouth once daily.      Stiolto Respimat 2.5-2.5 mcg/actuation mist inhaler Inhale 2 Inhalations once daily.      Trelegy Ellipta 200-62.5-25 mcg blister with device Inhale 1 puff once daily. 28 each 3     No current facility-administered medications for this visit.     Allergies:    Allergies   Allergen Reactions    Other Unknown       Taking medications as prescribed: Yes       Blood Pressure Management:  Hx of Hypertension: No   Resting BP:  120/62    Smoking/Tobacco Assessment;    Social History     Tobacco Use   Smoking Status Not on file   Smokeless Tobacco Not on file            # of hospital admissions due to lung problems: No  # of ER visits due to lung problems: No    Individual Patient Goals:  Breath better and feel better and be more active  General Comments:  Pt with history of COPD on RA. Pt has been to 25 sessions at pulmonary rehab. Pt has been able to complete the full time of 45 minutes of exercise. Pt is limited to the NS and TM due to pain, We will discuss increasing him on speed and resistance. Will continue to monitor.         Rehab Staff Signature: CLARE OLVERA, RRT

## 2023-10-24 ENCOUNTER — CLINICAL SUPPORT (OUTPATIENT)
Dept: CARDIAC REHAB | Facility: CLINIC | Age: 68
End: 2023-10-24
Payer: MEDICARE

## 2023-10-24 ENCOUNTER — APPOINTMENT (OUTPATIENT)
Dept: CARDIAC REHAB | Facility: CLINIC | Age: 68
End: 2023-10-24
Payer: MEDICARE

## 2023-10-24 DIAGNOSIS — J44.9 CHRONIC OBSTRUCTIVE PULMONARY DISEASE, UNSPECIFIED COPD TYPE (MULTI): Primary | ICD-10-CM

## 2023-10-24 PROCEDURE — 94626 PHY/QHP OP PULM RHB W/MNTR: CPT | Performed by: INTERNAL MEDICINE

## 2023-10-26 ENCOUNTER — CLINICAL SUPPORT (OUTPATIENT)
Dept: CARDIAC REHAB | Facility: CLINIC | Age: 68
End: 2023-10-26
Payer: MEDICARE

## 2023-10-26 DIAGNOSIS — J44.9 CHRONIC OBSTRUCTIVE PULMONARY DISEASE, UNSPECIFIED COPD TYPE (MULTI): Primary | ICD-10-CM

## 2023-10-26 PROCEDURE — 94626 PHY/QHP OP PULM RHB W/MNTR: CPT | Performed by: INTERNAL MEDICINE

## 2023-10-31 ENCOUNTER — APPOINTMENT (OUTPATIENT)
Dept: CARDIAC REHAB | Facility: CLINIC | Age: 68
End: 2023-10-31
Payer: MEDICARE

## 2023-10-31 ENCOUNTER — CLINICAL SUPPORT (OUTPATIENT)
Dept: CARDIAC REHAB | Facility: CLINIC | Age: 68
End: 2023-10-31
Payer: MEDICARE

## 2023-10-31 PROCEDURE — 94626 PHY/QHP OP PULM RHB W/MNTR: CPT

## 2023-11-02 ENCOUNTER — CLINICAL SUPPORT (OUTPATIENT)
Dept: CARDIAC REHAB | Facility: CLINIC | Age: 68
End: 2023-11-02
Payer: MEDICARE

## 2023-11-02 DIAGNOSIS — J44.9 CHRONIC OBSTRUCTIVE PULMONARY DISEASE, UNSPECIFIED COPD TYPE (MULTI): Primary | ICD-10-CM

## 2023-11-02 PROCEDURE — 94626 PHY/QHP OP PULM RHB W/MNTR: CPT | Performed by: INTERNAL MEDICINE

## 2023-11-07 ENCOUNTER — CLINICAL SUPPORT (OUTPATIENT)
Dept: CARDIAC REHAB | Facility: CLINIC | Age: 68
End: 2023-11-07
Payer: MEDICARE

## 2023-11-07 ENCOUNTER — APPOINTMENT (OUTPATIENT)
Dept: CARDIAC REHAB | Facility: CLINIC | Age: 68
End: 2023-11-07
Payer: MEDICARE

## 2023-11-07 DIAGNOSIS — J44.9 CHRONIC OBSTRUCTIVE PULMONARY DISEASE, UNSPECIFIED COPD TYPE (MULTI): Primary | ICD-10-CM

## 2023-11-07 DIAGNOSIS — J43.9 PULMONARY EMPHYSEMA, UNSPECIFIED EMPHYSEMA TYPE (MULTI): ICD-10-CM

## 2023-11-07 PROCEDURE — 94626 PHY/QHP OP PULM RHB W/MNTR: CPT | Performed by: INTERNAL MEDICINE

## 2023-11-09 ENCOUNTER — LAB (OUTPATIENT)
Dept: LAB | Facility: LAB | Age: 68
End: 2023-11-09
Payer: MEDICARE

## 2023-11-09 ENCOUNTER — CLINICAL SUPPORT (OUTPATIENT)
Dept: CARDIAC REHAB | Facility: CLINIC | Age: 68
End: 2023-11-09
Payer: MEDICARE

## 2023-11-09 ENCOUNTER — OFFICE VISIT (OUTPATIENT)
Dept: PRIMARY CARE | Facility: CLINIC | Age: 68
End: 2023-11-09
Payer: MEDICARE

## 2023-11-09 VITALS
OXYGEN SATURATION: 94 % | HEIGHT: 68 IN | SYSTOLIC BLOOD PRESSURE: 119 MMHG | BODY MASS INDEX: 24.61 KG/M2 | WEIGHT: 162.4 LBS | DIASTOLIC BLOOD PRESSURE: 74 MMHG | HEART RATE: 82 BPM

## 2023-11-09 DIAGNOSIS — Z00.00 HEALTHCARE MAINTENANCE: Primary | ICD-10-CM

## 2023-11-09 DIAGNOSIS — J44.9 CHRONIC OBSTRUCTIVE PULMONARY DISEASE, UNSPECIFIED COPD TYPE (MULTI): Primary | ICD-10-CM

## 2023-11-09 DIAGNOSIS — J42 CHRONIC BRONCHITIS, UNSPECIFIED CHRONIC BRONCHITIS TYPE (MULTI): ICD-10-CM

## 2023-11-09 DIAGNOSIS — M05.79 RHEUMATOID ARTHRITIS INVOLVING MULTIPLE SITES WITH POSITIVE RHEUMATOID FACTOR (MULTI): ICD-10-CM

## 2023-11-09 DIAGNOSIS — Z12.5 ENCOUNTER FOR SCREENING FOR MALIGNANT NEOPLASM OF PROSTATE: ICD-10-CM

## 2023-11-09 DIAGNOSIS — J43.9 PULMONARY EMPHYSEMA, UNSPECIFIED EMPHYSEMA TYPE (MULTI): ICD-10-CM

## 2023-11-09 DIAGNOSIS — E78.5 HYPERLIPIDEMIA, UNSPECIFIED HYPERLIPIDEMIA TYPE: ICD-10-CM

## 2023-11-09 DIAGNOSIS — Z00.00 MEDICARE ANNUAL WELLNESS VISIT, SUBSEQUENT: ICD-10-CM

## 2023-11-09 DIAGNOSIS — F17.201 TOBACCO ABUSE, IN REMISSION: ICD-10-CM

## 2023-11-09 PROBLEM — M54.12 CERVICAL MYELOPATHY WITH CERVICAL RADICULOPATHY (MULTI): Status: RESOLVED | Noted: 2023-09-08 | Resolved: 2023-11-09

## 2023-11-09 PROBLEM — G95.9 CERVICAL MYELOPATHY WITH CERVICAL RADICULOPATHY (MULTI): Status: RESOLVED | Noted: 2023-09-08 | Resolved: 2023-11-09

## 2023-11-09 PROBLEM — G95.20 CERVICAL SPINAL CORD COMPRESSION (MULTI): Status: RESOLVED | Noted: 2023-09-08 | Resolved: 2023-11-09

## 2023-11-09 LAB
BASOPHILS # BLD AUTO: 0.1 X10*3/UL (ref 0–0.1)
BASOPHILS NFR BLD AUTO: 1.2 %
EOSINOPHIL # BLD AUTO: 0.28 X10*3/UL (ref 0–0.7)
EOSINOPHIL NFR BLD AUTO: 3.2 %
ERYTHROCYTE [DISTWIDTH] IN BLOOD BY AUTOMATED COUNT: 12.2 % (ref 11.5–14.5)
HCT VFR BLD AUTO: 45.4 % (ref 41–52)
HGB BLD-MCNC: 15.4 G/DL (ref 13.5–17.5)
IMM GRANULOCYTES # BLD AUTO: 0.04 X10*3/UL (ref 0–0.7)
IMM GRANULOCYTES NFR BLD AUTO: 0.5 % (ref 0–0.9)
LYMPHOCYTES # BLD AUTO: 2.95 X10*3/UL (ref 1.2–4.8)
LYMPHOCYTES NFR BLD AUTO: 34.1 %
MCH RBC QN AUTO: 30.5 PG (ref 26–34)
MCHC RBC AUTO-ENTMCNC: 33.9 G/DL (ref 32–36)
MCV RBC AUTO: 90 FL (ref 80–100)
MONOCYTES # BLD AUTO: 0.85 X10*3/UL (ref 0.1–1)
MONOCYTES NFR BLD AUTO: 9.8 %
NEUTROPHILS # BLD AUTO: 4.44 X10*3/UL (ref 1.2–7.7)
NEUTROPHILS NFR BLD AUTO: 51.2 %
NRBC BLD-RTO: 0 /100 WBCS (ref 0–0)
PLATELET # BLD AUTO: 268 X10*3/UL (ref 150–450)
PSA SERPL-MCNC: 10.24 NG/ML
RBC # BLD AUTO: 5.05 X10*6/UL (ref 4.5–5.9)
WBC # BLD AUTO: 8.7 X10*3/UL (ref 4.4–11.3)

## 2023-11-09 PROCEDURE — 82103 ALPHA-1-ANTITRYPSIN TOTAL: CPT

## 2023-11-09 PROCEDURE — 1036F TOBACCO NON-USER: CPT | Performed by: STUDENT IN AN ORGANIZED HEALTH CARE EDUCATION/TRAINING PROGRAM

## 2023-11-09 PROCEDURE — 1170F FXNL STATUS ASSESSED: CPT | Performed by: STUDENT IN AN ORGANIZED HEALTH CARE EDUCATION/TRAINING PROGRAM

## 2023-11-09 PROCEDURE — 1159F MED LIST DOCD IN RCRD: CPT | Performed by: STUDENT IN AN ORGANIZED HEALTH CARE EDUCATION/TRAINING PROGRAM

## 2023-11-09 PROCEDURE — G0103 PSA SCREENING: HCPCS

## 2023-11-09 PROCEDURE — 1160F RVW MEDS BY RX/DR IN RCRD: CPT | Performed by: STUDENT IN AN ORGANIZED HEALTH CARE EDUCATION/TRAINING PROGRAM

## 2023-11-09 PROCEDURE — 80053 COMPREHEN METABOLIC PANEL: CPT

## 2023-11-09 PROCEDURE — 94626 PHY/QHP OP PULM RHB W/MNTR: CPT | Performed by: INTERNAL MEDICINE

## 2023-11-09 PROCEDURE — 80061 LIPID PANEL: CPT

## 2023-11-09 PROCEDURE — 99397 PER PM REEVAL EST PAT 65+ YR: CPT | Performed by: STUDENT IN AN ORGANIZED HEALTH CARE EDUCATION/TRAINING PROGRAM

## 2023-11-09 PROCEDURE — 1126F AMNT PAIN NOTED NONE PRSNT: CPT | Performed by: STUDENT IN AN ORGANIZED HEALTH CARE EDUCATION/TRAINING PROGRAM

## 2023-11-09 PROCEDURE — G0439 PPPS, SUBSEQ VISIT: HCPCS | Performed by: STUDENT IN AN ORGANIZED HEALTH CARE EDUCATION/TRAINING PROGRAM

## 2023-11-09 PROCEDURE — 85025 COMPLETE CBC W/AUTO DIFF WBC: CPT

## 2023-11-09 PROCEDURE — 36415 COLL VENOUS BLD VENIPUNCTURE: CPT

## 2023-11-09 ASSESSMENT — PATIENT HEALTH QUESTIONNAIRE - PHQ9
SUM OF ALL RESPONSES TO PHQ9 QUESTIONS 1 AND 2: 0
1. LITTLE INTEREST OR PLEASURE IN DOING THINGS: NOT AT ALL
2. FEELING DOWN, DEPRESSED OR HOPELESS: NOT AT ALL

## 2023-11-09 ASSESSMENT — ACTIVITIES OF DAILY LIVING (ADL)
DRESSING: INDEPENDENT
MANAGING_FINANCES: INDEPENDENT
GROCERY_SHOPPING: INDEPENDENT
TAKING_MEDICATION: INDEPENDENT
DOING_HOUSEWORK: INDEPENDENT
BATHING: INDEPENDENT

## 2023-11-09 NOTE — PROGRESS NOTES
"Subjective   Reason for Visit: Felix Mancera is an 68 y.o. male here for a Medicare Wellness visit.     Past Medical, Surgical, and Family History reviewed and updated in chart.    Reviewed all medications by prescribing practitioner or clinical pharmacist (such as prescriptions, OTCs, herbal therapies and supplements) and documented in the medical record.    HPI  Re: COPD - GOLD 3E. See pulm notes. Admitted to MICU in 6/23 for a severe exacerbation. Now on high dose Trelegy and JONATHAN/Duonebs a few days a week. Doing well since this hospital stay. Now in with pulmonary rehab; endurance improving.     Re: RA - diagnosed many years ago by a rheumatologist, however hasn't seen one in years. No longer on MTX, but getting his plaquenil through me. Sees his eye doctor regularly. Sx remain stable; no worsening of his quiescent MCP swelling and joint stiffness. No extra-articular symptoms.      Re:  - cologard current, repeat 2025. PSA due. RSV due at pharmacy.      PMHx, FHx, Social Hx, Surg Hx personally reviewed at this appointment. No pertinent findings and/or changes from prior (if applicable).     ROS: Denies wt gain/loss f/c HA LoC CP SOB NVDC. See HPI above, and scanned sheet (if applicable). All other systems are reviewed and are without complaint.     Patient Care Team:  Terrence Farley MD as PCP - General  Terrence Farley MD as PCP - [employer] PCP     Review of Systems    Objective   Vitals:  /74   Pulse 82   Ht 1.727 m (5' 8\")   Wt 73.7 kg (162 lb 6.4 oz)   SpO2 94%   BMI 24.69 kg/m²       Physical Exam  Gen: well developed in NAD. AAO x3.  HEENT: NC/AT. Anicteric sclera, symmetric pupils. MMM no thrush.  Neck: Soft, supple. No LAD. No goiter.   CV: RRR nl s1s2 no m/r/g  Pulm: CTAB no w/r/r, good air exchange  GI: soft NTND BS+ no hsm  Ext: WWP no edema  Neuro: II-XII grossly intact, nonfocal systemic findings  MSK: 5/5 strength b/l UE and LE. Reduced RoM bilateral shoulders " 2/2 pain and crepitus.   Gait: unremarkable  Joints: stable mild joint thickening of MCPs and PIPs, however appear chronic in nature with no swelling or erythema    Lab Results   Component Value Date    WBC 10.7 06/24/2023    HGB 15.7 06/24/2023    HCT 47.0 06/24/2023     06/24/2023    CHOL 236 (H) 06/08/2022    TRIG 90 06/08/2022    HDL 56.8 06/08/2022    ALT 32 06/19/2023    AST 43 (H) 06/19/2023     06/24/2023    K 5.0 06/24/2023    CL 98 06/24/2023    CREATININE 0.78 06/24/2023    BUN 29 (H) 06/24/2023    CO2 34 (H) 06/24/2023    PSA 6.66 (H) 08/24/2020    HGBA1C 5.9 (A) 06/20/2022     par    CT lung screening low dose  Narrative: Interpreted By:  ENRICO GROSSMAN MD  MRN: [id number]  Patient Name: KIMBER TURK     STUDY:   CT CHEST LOW DOSE FOR LUNG SCREENING WO CONTRAST;  9/1/2023 3:23 pm     INDICATION:  cough, dyspnea on exertion  R91.8: Lung nodules.     COMPARISON:  08/12/2022.     ACCESSION NUMBER(S):  [id number]     ORDERING CLINICIAN:  SARITHA VALDES     TECHNIQUE:  Helical data acquisition of the chest was obtained without IV  contrast material.  Images were reformatted in axial, coronal, and  sagittal planes.     FINDINGS:  LUNGS AND AIRWAYS:  The trachea and central airways are patent. No endobronchial lesion  is seen.     There is severe emphysematous changes. There is diffuse peribronchial  thickening.There is no focal consolidation, pleural effusion, or  pneumothorax.     There is a 3 mm right lower lobe parenchymal lung nodule unchanged.  There are no suspicious parenchymal lung nodules.     MEDIASTINUM AND ZACH, LOWER NECK AND AXILLA:  The visualized thyroid gland is within normal limits.  Scattered mediastinal lymph nodes are felt to be reactive in nature  Esophagus appears within normal limits as seen.     HEART AND VESSELS:  There is mild atherosclerotic changes of the thoracic aorta.  Main pulmonary artery and its branches are normal in caliber.  There is mild coronary artery  calcifications.  The cardiac chambers are not enlarged.  There is no pericardial effusion seen.     UPPER ABDOMEN:  The visualized subdiaphragmatic structures demonstrate no remarkable  findings.           CHEST WALL AND OSSEOUS STRUCTURES:  Chest wall is within normal limits.  No acute osseous pathology.There are no suspicious osseous lesions.     Impression: 1. There are no suspicious parenchymal lung nodules. Recommend  continued 1 year low-dose chest CT for surveillance.           LUNG RADS CATEGORY:  Lung-RADS 2 (Benign Appearance or Indolent behavior): Continue with  annual screening in 12 months,  CT Chest Lung Ca Screen Initial  or Follow up LR1/LR2/Continuation of Screening Low Dose recommended  as per American College of Radiology Guidelines Lung-RADS Version  2022.        Assessment/Plan   # COPD: GOLD 3E  - continue inhalers (high dose Trelegy, JONATHAN)  - follow up pulm    # RA: appears quiescent  - continue plaquenil  - to f/u with blood work, vision screens     # Chronic pain: marked improvement since surgery  - f/u pain mgmt        # Health Maintenance  - routine blood work  - Colonoscopy: UTD (repeat cologard 2025)  - PSA: due, ordered today   - Immunizations: UTD  - AAA screen: due, ordered today        Problem List Items Addressed This Visit       COPD (chronic obstructive pulmonary disease) (CMS/HCC)    Rheumatoid arthritis (CMS/HCC)    HLD (hyperlipidemia)    Relevant Orders    CBC and Auto Differential    Comprehensive Metabolic Panel    Lipid Panel    Tobacco abuse, in remission    Relevant Orders    Vascular US abdominal aorta anuerysm AAA screening     Other Visit Diagnoses       Healthcare maintenance    -  Primary    Medicare annual wellness visit, subsequent        Encounter for screening for malignant neoplasm of prostate        Relevant Orders    Prostate Specific Antigen, Screen

## 2023-11-09 NOTE — PATIENT INSTRUCTIONS
Please stop at the lab ([address]) to complete your blood and/or urine work that I've ordered for you.    I will contact you with the results at my soonest convenience. I strongly urge you to use [employer] as this is the quickest and easiest way to access your results and receive my correspondences.    Your medications are up to date today, I will renew them when a refill is due.     I have placed an order for an ultrasound of abdominal aorta due to your smoking history. Stop by the radiology department on the first floor to schedule, or call the scheduling line at [phone].      I recommend the following shots:  RSV at pharmacy.     See me yearly for a Medicare Wellness Visit, and sooner as needed for sick visits

## 2023-11-10 DIAGNOSIS — N40.0 BPH WITH ELEVATED PSA: Primary | ICD-10-CM

## 2023-11-10 DIAGNOSIS — R97.20 BPH WITH ELEVATED PSA: Primary | ICD-10-CM

## 2023-11-10 DIAGNOSIS — R97.20 ELEVATED PSA: Primary | ICD-10-CM

## 2023-11-10 DIAGNOSIS — E78.5 HYPERLIPIDEMIA, UNSPECIFIED HYPERLIPIDEMIA TYPE: ICD-10-CM

## 2023-11-10 LAB
A1AT SERPL NEPH-MCNC: 147 MG/DL (ref 84–218)
ALBUMIN SERPL BCP-MCNC: 4.7 G/DL (ref 3.4–5)
ALP SERPL-CCNC: 33 U/L (ref 33–136)
ALT SERPL W P-5'-P-CCNC: 25 U/L (ref 10–52)
ANION GAP SERPL CALC-SCNC: 20 MMOL/L (ref 10–20)
AST SERPL W P-5'-P-CCNC: 28 U/L (ref 9–39)
BILIRUB SERPL-MCNC: 0.3 MG/DL (ref 0–1.2)
BUN SERPL-MCNC: 18 MG/DL (ref 6–23)
CALCIUM SERPL-MCNC: 10.3 MG/DL (ref 8.6–10.6)
CHLORIDE SERPL-SCNC: 101 MMOL/L (ref 98–107)
CHOLEST SERPL-MCNC: 269 MG/DL (ref 0–199)
CHOLESTEROL/HDL RATIO: 4.8
CO2 SERPL-SCNC: 23 MMOL/L (ref 21–32)
CREAT SERPL-MCNC: 1.12 MG/DL (ref 0.5–1.3)
GFR SERPL CREATININE-BSD FRML MDRD: 71 ML/MIN/1.73M*2
GLUCOSE SERPL-MCNC: 80 MG/DL (ref 74–99)
HDLC SERPL-MCNC: 56.3 MG/DL
LDLC SERPL CALC-MCNC: 183 MG/DL
NON HDL CHOLESTEROL: 213 MG/DL (ref 0–149)
POTASSIUM SERPL-SCNC: 4.4 MMOL/L (ref 3.5–5.3)
PROT SERPL-MCNC: 7.7 G/DL (ref 6.4–8.2)
SODIUM SERPL-SCNC: 140 MMOL/L (ref 136–145)
TRIGL SERPL-MCNC: 151 MG/DL (ref 0–149)
VLDL: 30 MG/DL (ref 0–40)

## 2023-11-10 RX ORDER — ATORVASTATIN CALCIUM 40 MG/1
40 TABLET, FILM COATED ORAL DAILY
Qty: 90 TABLET | Refills: 3 | Status: SHIPPED | OUTPATIENT
Start: 2023-11-10 | End: 2024-03-11 | Stop reason: SDUPTHER

## 2023-11-10 RX ORDER — TAMSULOSIN HYDROCHLORIDE 0.4 MG/1
0.4 CAPSULE ORAL DAILY
Qty: 90 CAPSULE | Refills: 3 | Status: SHIPPED | OUTPATIENT
Start: 2023-11-10 | End: 2023-12-14 | Stop reason: WASHOUT

## 2023-11-10 NOTE — PROGRESS NOTES
The 10-year ASCVD risk score (Harpreet VILLAR, et al., 2019) is: 16.6%    Values used to calculate the score:      Age: 68 years      Sex: Male      Is Non- : No      Diabetic: No      Tobacco smoker: No      Systolic Blood Pressure: 119 mmHg      Is BP treated: No      HDL Cholesterol: 56.3 mg/dL      Total Cholesterol: 269 mg/dL     Elevated lipids. Trending in wrong direction. Starting Atorvastatin 40mg.     Also elevated PSA from 5 to 10. Will start Tamsulosin and recheck in a few weeks.

## 2023-11-10 NOTE — PROGRESS NOTES
I have referred you to urology for your  complaint. Please call [phone] to schedule an appointment.

## 2023-11-14 ENCOUNTER — CLINICAL SUPPORT (OUTPATIENT)
Dept: CARDIAC REHAB | Facility: CLINIC | Age: 68
End: 2023-11-14
Payer: MEDICARE

## 2023-11-14 ENCOUNTER — APPOINTMENT (OUTPATIENT)
Dept: CARDIAC REHAB | Facility: CLINIC | Age: 68
End: 2023-11-14
Payer: MEDICARE

## 2023-11-14 DIAGNOSIS — Z00.00 HEALTHCARE MAINTENANCE: ICD-10-CM

## 2023-11-14 DIAGNOSIS — J44.9 CHRONIC OBSTRUCTIVE PULMONARY DISEASE, UNSPECIFIED COPD TYPE (MULTI): Primary | ICD-10-CM

## 2023-11-14 PROCEDURE — 94626 PHY/QHP OP PULM RHB W/MNTR: CPT | Performed by: INTERNAL MEDICINE

## 2023-11-14 RX ORDER — FLUTICASONE FUROATE, UMECLIDINIUM BROMIDE AND VILANTEROL TRIFENATATE 200; 62.5; 25 UG/1; UG/1; UG/1
1 POWDER RESPIRATORY (INHALATION)
Qty: 28 EACH | Refills: 3 | Status: SHIPPED | OUTPATIENT
Start: 2023-11-14 | End: 2024-03-11 | Stop reason: SDUPTHER

## 2023-11-15 NOTE — PROGRESS NOTES
"  Pulmonary Rehabilitation 30 Day Assessment     Name: Felix Mancera  Medical Record Number: [id number]  YOB: 1955     Today’s Date: 10/23/2023  Primary Care Physician: Terrence Farley MD  Referring Physician: Alexandria Jung MD     Program Location: [facility]      General  Primary Diagnosis: J44.9  Date of Diagnosis: 1/15/23     Session #: 32     Oxygen Assessment     SP02 rest: 95 %  SP02 exertion: 93 %              Home Pulse Oximeter?: Yes       MMRC Survey score:  Intake: 2  Discharge: NA     Goal Status: In progress  Oxygen Goals: To decrease MMRC score  Oxygen Interventions: To maintain oxygen saturation of >88%        Psychosocial Assessment     Pt reported/currently experiencing:  Pt reported low stress level  Social Support: Yes, Whom:Family     PHQ-9 Survey Score:   Intake: 1  Discharge: NA     CAT Survey Score:   Intake: 13  Discharge: NA     Learning assessment/barriers: None              Goal Status: In progress  Psychosocial Goals: Decrease CAT score   Psychosocial Interventions/Education: To      Nutrition Assessment:     Current Dietary Guidelines: No restrictions  Barriers to dietary change: No      Diet Habit Survey: drop down selection of Picture Your Plate,    Plate:    Intake: 62  Discharge: NA     Weight Management     Height: 5'8\"  Weight: 162lb  BMI: 24.69   Goal Status: In progress  Nutrition Goals: To maintain weight  Nutrition Interventions/Education: 8/22/23 label reading     Exercise Assessment  Current Home Exercise: No      6-minute Walk Assessment:  Intake: 6-MWT distance (meters):305     FiO2: RA      SPO2:92%  Discharge: 6-MWT distance (meters):      FiO2:     SPO2:     Exercise Prescription:               Based on: {6-min walk test              Frequency:  2 days/week              Mode: Treadmill and NuStep              Duration: 45 total aerobic minutes              Intensity: RPE 10-12                             Target -140          "                    SpO2 Range 88 to 100 %                                   Modality METS Load Duration   1 Pre-Exercise     2-5   2 Treadmill  2.2 1.5 15   3 Nustep 4000  2.8 3 30   4 Recumbent Cycle          5 Weights          6 Post-Exercise     2-5      Resistance Training: No   Home Exercise Prescription given: No   Goal Status: In progress  Exercise Goals: To start home exercise program         Other Core Components/Risk Factor Assessment:     Medication adherence:  Current Medications:    Current Medications          Current Outpatient Medications   Medication Sig Dispense Refill    albuterol 90 mcg/actuation inhaler Inhale 2 puffs every 4 hours if needed.        budesonide (Pulmicort) 0.5 mg/2 mL nebulizer solution Inhale twice a day.        DULoxetine (Cymbalta) 60 mg DR capsule Take 1 capsule (60 mg) by mouth once daily at bedtime.        folic acid (Folvite) 1 mg tablet Take 1 tablet (1 mg) by mouth once daily.        gabapentin (Neurontin) 800 mg tablet Take by mouth.        hydroxychloroquine (Plaquenil) 200 mg tablet Take 1 tablet (200 mg) by mouth once daily.        ipratropium (Atrovent) 0.02 % nebulizer solution Inhale.        ipratropium-albuteroL (Duo-Neb) 0.5-2.5 mg/3 mL nebulizer solution Take 3 mL by nebulization 4 times a day. As needed        multivitamin with iron (DAILY MULTIPLE VITAMINS/IRON ORAL) Take 1 tablet by mouth once daily.        NON FORMULARY Patient with h/o advanced COPD, currently requiring supplemental oxygen,requires home nebulizzer for administration of bronchodilators ( Albuterol solution), for rescue therapy. I attest that this is a medically necessary treatment.        roflumilast (Daliresp) 250 mcg tablet Take 1 tablet (250 mcg) by mouth once daily.        Stiolto Respimat 2.5-2.5 mcg/actuation mist inhaler Inhale 2 Inhalations once daily.        Trelegy Ellipta 200-62.5-25 mcg blister with device Inhale 1 puff once daily. 28 each 3      No current facility-administered  medications for this visit.         Allergies:         Allergies   Allergen Reactions    Other Unknown         Taking medications as prescribed: Yes         Blood Pressure Management:  Hx of Hypertension: No   Resting BP: 116/62     Smoking/Tobacco Assessment;     Social History          Tobacco Use   Smoking Status Not on file   Smokeless Tobacco Not on file                          # of hospital admissions due to lung problems: No  # of ER visits due to lung problems: No     Individual Patient Goals:  Breath better and feel better and be more active  General Comments:  Pt with history of COPD on RA. Pt continues to have a higher resting HR. Pt has been to 32 sessions at pulmonary rehab. Pt has been able to complete the full time of 45 minutes of exercise. Pt is limited to the NS and TM due to pain. We went up on his TM speed and NS level to a 3. Will be giving patient his discharge paperwork as he only has 4 remaining sessions.           Rehab Staff Signature: CLARE OLVERA, RRT

## 2023-11-16 ENCOUNTER — CLINICAL SUPPORT (OUTPATIENT)
Dept: CARDIAC REHAB | Facility: CLINIC | Age: 68
End: 2023-11-16
Payer: MEDICARE

## 2023-11-16 DIAGNOSIS — J44.9 CHRONIC OBSTRUCTIVE PULMONARY DISEASE, UNSPECIFIED COPD TYPE (MULTI): Primary | ICD-10-CM

## 2023-11-16 PROCEDURE — 94626 PHY/QHP OP PULM RHB W/MNTR: CPT | Performed by: INTERNAL MEDICINE

## 2023-11-21 ENCOUNTER — APPOINTMENT (OUTPATIENT)
Dept: CARDIAC REHAB | Facility: CLINIC | Age: 68
End: 2023-11-21
Payer: MEDICARE

## 2023-11-28 ENCOUNTER — CLINICAL SUPPORT (OUTPATIENT)
Dept: CARDIAC REHAB | Facility: CLINIC | Age: 68
End: 2023-11-28
Payer: MEDICARE

## 2023-11-28 ENCOUNTER — HOSPITAL ENCOUNTER (OUTPATIENT)
Dept: VASCULAR MEDICINE | Facility: CLINIC | Age: 68
Discharge: HOME | End: 2023-11-28
Payer: MEDICARE

## 2023-11-28 DIAGNOSIS — Z13.6 ENCOUNTER FOR SCREENING FOR CARDIOVASCULAR DISORDERS: ICD-10-CM

## 2023-11-28 DIAGNOSIS — F17.201 TOBACCO ABUSE, IN REMISSION: ICD-10-CM

## 2023-11-28 DIAGNOSIS — J44.9 CHRONIC OBSTRUCTIVE PULMONARY DISEASE, UNSPECIFIED COPD TYPE (MULTI): Primary | ICD-10-CM

## 2023-11-28 PROCEDURE — 76706 US ABDL AORTA SCREEN AAA: CPT | Performed by: INTERNAL MEDICINE

## 2023-11-28 PROCEDURE — 76706 US ABDL AORTA SCREEN AAA: CPT

## 2023-11-28 PROCEDURE — 94626 PHY/QHP OP PULM RHB W/MNTR: CPT | Performed by: INTERNAL MEDICINE

## 2023-11-30 ENCOUNTER — CLINICAL SUPPORT (OUTPATIENT)
Dept: CARDIAC REHAB | Facility: CLINIC | Age: 68
End: 2023-11-30
Payer: MEDICARE

## 2023-11-30 ENCOUNTER — APPOINTMENT (OUTPATIENT)
Dept: CARDIAC REHAB | Facility: CLINIC | Age: 68
End: 2023-11-30
Payer: MEDICARE

## 2023-11-30 DIAGNOSIS — J44.9 CHRONIC OBSTRUCTIVE PULMONARY DISEASE, UNSPECIFIED COPD TYPE (MULTI): Primary | ICD-10-CM

## 2023-11-30 PROCEDURE — 94626 PHY/QHP OP PULM RHB W/MNTR: CPT | Performed by: INTERNAL MEDICINE

## 2023-12-05 ENCOUNTER — CLINICAL SUPPORT (OUTPATIENT)
Dept: CARDIAC REHAB | Facility: CLINIC | Age: 68
End: 2023-12-05
Payer: MEDICARE

## 2023-12-05 DIAGNOSIS — J44.9 CHRONIC OBSTRUCTIVE PULMONARY DISEASE, UNSPECIFIED COPD TYPE (MULTI): Primary | ICD-10-CM

## 2023-12-05 PROCEDURE — 94626 PHY/QHP OP PULM RHB W/MNTR: CPT | Performed by: INTERNAL MEDICINE

## 2023-12-06 NOTE — PROGRESS NOTES
"Pulmonary Rehabilitation Discharge     Name: Felix Mancera  Medical Record Number: [id number]  YOB: 1955     Today’s Date: 10/23/2023  Primary Care Physician: Terrence Farley MD  Referring Physician: Alexandria Jung MD     Program Location: [facility]      General  Primary Diagnosis: J44.9  Date of Diagnosis: 1/15/23     Session #: 36     Oxygen Assessment     SP02 rest: 92 %  SP02 exertion: 94%              Home Pulse Oximeter?: Yes       MMRC Survey score:  Intake: 2  Discharge: 3     Goal Status: In progress  Oxygen Goals: To decrease MMRC score  Oxygen Interventions: To maintain oxygen saturation of >88%        Psychosocial Assessment     Pt reported/currently experiencing:  Pt reported low stress level  Social Support: Yes, Whom:Family     PHQ-9 Survey Score:   Intake: 1  Discharge: 1     CAT Survey Score:   Intake: 13  Discharge: 14     Learning assessment/barriers: None              Goal Status: In progress  Psychosocial Goals: Decrease CAT score   Psychosocial Interventions/Education: To      Nutrition Assessment:     Current Dietary Guidelines: No restrictions  Barriers to dietary change: No      Diet Habit Survey: drop down selection of Picture Your Plate,    Plate:    Intake: 62  Discharge: 67     Weight Management     Height: 5'8\"  Weight: 162lb  BMI: 24.69   Goal Status: Completed  Nutrition Goals: To maintain weight  Nutrition Interventions/Education: 8/22/23 label reading     Exercise Assessment  Current Home Exercise: No      6-minute Walk Assessment:  Intake: 6-MWT distance (meters):305     FiO2: RA      SPO2:92%  Discharge: 6-MWT distance (meters): 335     FiO2: RA     SPO2: 92%     Exercise Prescription:               Based on: {6-min walk test              Frequency:  2 days/week              Mode: Treadmill and NuStep              Duration: 45 total aerobic minutes              Intensity: RPE 10-12                             Target -140               "               SpO2 Range 88 to 100 %                                   Modality METS Load Duration   1 Pre-Exercise     2-5   2 Treadmill  2.2 1.5 15   3 Nustep 4000  2.8 3 30   4 Recumbent Cycle          5 Weights          6 Post-Exercise     2-5      Resistance Training: No   Home Exercise Prescription given: Yes  Goal Status: Completed  Exercise Goals: Pt will be signing up for silver sneakers.         Other Core Components/Risk Factor Assessment:     Medication adherence:  Current Medications:    Current Medications               Current Outpatient Medications   Medication Sig Dispense Refill    albuterol 90 mcg/actuation inhaler Inhale 2 puffs every 4 hours if needed.        budesonide (Pulmicort) 0.5 mg/2 mL nebulizer solution Inhale twice a day.        DULoxetine (Cymbalta) 60 mg DR capsule Take 1 capsule (60 mg) by mouth once daily at bedtime.        folic acid (Folvite) 1 mg tablet Take 1 tablet (1 mg) by mouth once daily.        gabapentin (Neurontin) 800 mg tablet Take by mouth.        hydroxychloroquine (Plaquenil) 200 mg tablet Take 1 tablet (200 mg) by mouth once daily.        ipratropium (Atrovent) 0.02 % nebulizer solution Inhale.        ipratropium-albuteroL (Duo-Neb) 0.5-2.5 mg/3 mL nebulizer solution Take 3 mL by nebulization 4 times a day. As needed        multivitamin with iron (DAILY MULTIPLE VITAMINS/IRON ORAL) Take 1 tablet by mouth once daily.        NON FORMULARY Patient with h/o advanced COPD, currently requiring supplemental oxygen,requires home nebulizzer for administration of bronchodilators ( Albuterol solution), for rescue therapy. I attest that this is a medically necessary treatment.        roflumilast (Daliresp) 250 mcg tablet Take 1 tablet (250 mcg) by mouth once daily.        Stiolto Respimat 2.5-2.5 mcg/actuation mist inhaler Inhale 2 Inhalations once daily.        Trelegy Ellipta 200-62.5-25 mcg blister with device Inhale 1 puff once daily. 28 each 3      No current  facility-administered medications for this visit.         Allergies:            Allergies   Allergen Reactions    Other Unknown         Taking medications as prescribed: Yes         Blood Pressure Management:  Hx of Hypertension: No   Resting BP: 120/88     Smoking/Tobacco Assessment;     Social History            Tobacco Use   Smoking Status Not on file   Smokeless Tobacco Not on file                          # of hospital admissions due to lung problems: No  # of ER visits due to lung problems: No     Individual Patient Goals:  Breath better and feel better and be more active  General Comments:  Pt with history of COPD on RA. Pt has completed all 36 sessions of pulmonary rehab and graduated on 12/5/23. Pt felt that the program did help him achieve both of his goals of breathing better and feeling better. He will continue his exercise with joining silver sneakers and feels he also has a better understanding of his disease.   Pts CAT and MMRC score both increased ,but patient said this is because he was more honest on the post exercise surveys. His nutrition survey improved from 62 to 67 and his PHQ-9 remained the same at one. Pt did increase his walk from 1000 ft to 1110 ft.         Rehab Staff Signature: CLARE OLVERA, RRT

## 2023-12-07 ENCOUNTER — APPOINTMENT (OUTPATIENT)
Dept: CARDIAC REHAB | Facility: CLINIC | Age: 68
End: 2023-12-07
Payer: MEDICARE

## 2023-12-14 ENCOUNTER — APPOINTMENT (OUTPATIENT)
Dept: CARDIAC REHAB | Facility: CLINIC | Age: 68
End: 2023-12-14
Payer: MEDICARE

## 2023-12-14 ENCOUNTER — OFFICE VISIT (OUTPATIENT)
Dept: UROLOGY | Facility: HOSPITAL | Age: 68
End: 2023-12-14
Payer: MEDICARE

## 2023-12-14 DIAGNOSIS — R97.20 ELEVATED PSA: Primary | ICD-10-CM

## 2023-12-14 PROCEDURE — 99213 OFFICE O/P EST LOW 20 MIN: CPT | Performed by: UROLOGY

## 2023-12-14 PROCEDURE — 1036F TOBACCO NON-USER: CPT | Performed by: UROLOGY

## 2023-12-14 PROCEDURE — 1160F RVW MEDS BY RX/DR IN RCRD: CPT | Performed by: UROLOGY

## 2023-12-14 PROCEDURE — 99203 OFFICE O/P NEW LOW 30 MIN: CPT | Performed by: UROLOGY

## 2023-12-14 PROCEDURE — 1159F MED LIST DOCD IN RCRD: CPT | Performed by: UROLOGY

## 2023-12-14 PROCEDURE — 1126F AMNT PAIN NOTED NONE PRSNT: CPT | Performed by: UROLOGY

## 2023-12-14 NOTE — PROGRESS NOTES
HPI  The patient is a 68 y.o. male presenting on 12/14/2023 as a new patient for an elevated PSA.    LUTS  Urinary symptoms include: happy with his urinary symptoms. Weak stream in AM, then strong.   No prior surgeries    Erections: works well without meds; both attaining and maintaining erection    Prostate Cancer Screening:   No family history of prostate cancer      Component      Latest Ref Rng 10/1/2018 8/24/2020   Prostate Specific Antigen,Screen      <=4.00 ng/mL 4.20 (H)     PSA      0.00 - 4.00 ng/mL  6.66 (H)      Component      Latest Ref Rng 6/8/2022 11/9/2023   Prostate Specific Antigen,Screen      <=4.00 ng/mL 5.64 (H)  10.24 (H)    PSA      0.00 - 4.00 ng/mL          Current Medications:  Current Outpatient Medications   Medication Sig Dispense Refill    albuterol 90 mcg/actuation inhaler INHALE 1 TO 2 PUFFS BY MOUTH EVERY 4 TO 6 HOURS AS NEEDED 8.5 g 0    albuterol 90 mcg/actuation inhaler Inhale 2 puffs every 4 hours if needed for wheezing or shortness of breath. 18 g 3    atorvastatin (Lipitor) 40 mg tablet Take 1 tablet (40 mg) by mouth once daily. 90 tablet 3    budesonide (Pulmicort) 0.5 mg/2 mL nebulizer solution Inhale twice a day.      folic acid (Folvite) 1 mg tablet Take 1 tablet (1 mg) by mouth once daily.      hydroxychloroquine (Plaquenil) 200 mg tablet TAKE ONE TABLET BY MOUTH EVERY DAY 90 tablet 0    ipratropium (Atrovent) 0.02 % nebulizer solution Inhale.      ipratropium-albuteroL (Duo-Neb) 0.5-2.5 mg/3 mL nebulizer solution Take 3 mL by nebulization 4 times a day. As needed      multivitamin with iron (DAILY MULTIPLE VITAMINS/IRON ORAL) Take 1 tablet by mouth once daily.      NON FORMULARY Patient with h/o advanced COPD, currently requiring supplemental oxygen,requires home nebulizzer for administration of bronchodilators ( Albuterol solution), for rescue therapy. I attest that this is a medically necessary treatment.      tamsulosin (Flomax) 0.4 mg 24 hr capsule Take 1 capsule (0.4  mg) by mouth once daily. 90 capsule 3    Trelegy Ellipta 200-62.5-25 mcg blister with device Inhale 1 puff once daily. 28 each 3     No current facility-administered medications for this visit.        Active Problems:  Felix Mancera is a 68 y.o. male with the following Problems and Medications.  Patient Active Problem List   Diagnosis    COPD (chronic obstructive pulmonary disease) (CMS/HCC)    Rheumatoid arthritis (CMS/HCC)    Acute lumbar radiculopathy    Chronic bronchitis (CMS/HCC)    Chronic dyspnea    Chronic pain of both shoulders    Combined form of age-related cataract, right eye    HLD (hyperlipidemia)    Combs sign present    Hx of decompressive lumbar laminectomy    Hyperopia with presbyopia    Lumbar pain    Lung nodules    Nicotine dependence    Polyarthritis    Rotator cuff tear    Tinea cruris    Tobacco abuse, in remission    Unexplained weight loss    Cervical spinal stenosis     Current Outpatient Medications   Medication Sig Dispense Refill    albuterol 90 mcg/actuation inhaler INHALE 1 TO 2 PUFFS BY MOUTH EVERY 4 TO 6 HOURS AS NEEDED 8.5 g 0    albuterol 90 mcg/actuation inhaler Inhale 2 puffs every 4 hours if needed for wheezing or shortness of breath. 18 g 3    atorvastatin (Lipitor) 40 mg tablet Take 1 tablet (40 mg) by mouth once daily. 90 tablet 3    budesonide (Pulmicort) 0.5 mg/2 mL nebulizer solution Inhale twice a day.      folic acid (Folvite) 1 mg tablet Take 1 tablet (1 mg) by mouth once daily.      hydroxychloroquine (Plaquenil) 200 mg tablet TAKE ONE TABLET BY MOUTH EVERY DAY 90 tablet 0    ipratropium (Atrovent) 0.02 % nebulizer solution Inhale.      ipratropium-albuteroL (Duo-Neb) 0.5-2.5 mg/3 mL nebulizer solution Take 3 mL by nebulization 4 times a day. As needed      multivitamin with iron (DAILY MULTIPLE VITAMINS/IRON ORAL) Take 1 tablet by mouth once daily.      NON FORMULARY Patient with h/o advanced COPD, currently requiring supplemental oxygen,requires home  nebulizzer for administration of bronchodilators ( Albuterol solution), for rescue therapy. I attest that this is a medically necessary treatment.      tamsulosin (Flomax) 0.4 mg 24 hr capsule Take 1 capsule (0.4 mg) by mouth once daily. 90 capsule 3    Trelegy Ellipta 200-62.5-25 mcg blister with device Inhale 1 puff once daily. 28 each 3     No current facility-administered medications for this visit.       PMH:  Past Medical History:   Diagnosis Date    Other specified health status     No pertinent past medical history       PSH:  Past Surgical History:   Procedure Laterality Date    OTHER SURGICAL HISTORY  01/28/2021    Neck surgery       FMH:  Family History   Problem Relation Name Age of Onset    Other (AMD) Mother      Hypertension Mother         SHx:  Social History     Tobacco Use    Smoking status: Never    Smokeless tobacco: Never       Allergies:  Allergies   Allergen Reactions    Other Unknown     Physical exam:  Normal phallus, testes  HANNAH - grade 2, difficult to feel base of prostate, but no obvious concerning masses or nodules.    Assessment/Plan  I reviewed the AUA guideline on the early detection of prostate cancer with the patient. I discussed that PSA screening, or early detection, is done for the purpose of reducing prostate cancer mortality. I reviewed the possible causes for elevated PSA, including inflammation, infection, prostate cancer, etc.      His PSA is increased from baseline, but warrants recheck before proceeding with further evaluation. Will plan on rechecking PSA in 6 weeks. If still elevated, will discuss options including continued observation, TRUS biopsy, and MRI.     Follow up in 6wks via thv with PSA prior. Will have him stop flomax as he reports no bothersome LUTS at baseline and is not better since starting flomax.     Scribe Attestation  By signing my name below, I, Charlene Aragon , Scribe   attest that this documentation has been prepared under the direction and in  the presence of Felix Parrish MD.

## 2023-12-21 ENCOUNTER — APPOINTMENT (OUTPATIENT)
Dept: CARDIAC REHAB | Facility: CLINIC | Age: 68
End: 2023-12-21
Payer: MEDICARE

## 2023-12-28 ENCOUNTER — APPOINTMENT (OUTPATIENT)
Dept: CARDIAC REHAB | Facility: CLINIC | Age: 68
End: 2023-12-28
Payer: MEDICARE

## 2024-01-04 ENCOUNTER — APPOINTMENT (OUTPATIENT)
Dept: CARDIAC REHAB | Facility: CLINIC | Age: 69
End: 2024-01-04
Payer: MEDICARE

## 2024-01-08 ENCOUNTER — TELEPHONE (OUTPATIENT)
Dept: PULMONOLOGY | Facility: HOSPITAL | Age: 69
End: 2024-01-08
Payer: COMMERCIAL

## 2024-01-08 DIAGNOSIS — R05.1 ACUTE COUGH: Primary | ICD-10-CM

## 2024-01-08 RX ORDER — PREDNISONE 20 MG/1
40 TABLET ORAL DAILY
Qty: 10 TABLET | Refills: 0 | Status: SHIPPED | OUTPATIENT
Start: 2024-01-08 | End: 2024-01-13

## 2024-01-08 NOTE — TELEPHONE ENCOUNTER
Sent the following message to Dr. Guevara:  For approximately the past 7-10 days this patient has been experiencing a hard cough with off white sputum, RAY when walking a short distance, wheezing, and some chest tightness. He has been using his nebulizer with some relief. The patient denies fever, chills, and body aches. He is requesting a prescription of prednisone. It has been over a year since he was prescribed prednisone.   Informed the patient that per Dr. Guevara, a prescription for a prednisone burst has been sent to his pharmacy on record.  Patient expressed understanding and appreciation for the call.

## 2024-01-11 ENCOUNTER — APPOINTMENT (OUTPATIENT)
Dept: CARDIAC REHAB | Facility: CLINIC | Age: 69
End: 2024-01-11
Payer: MEDICARE

## 2024-01-18 ENCOUNTER — APPOINTMENT (OUTPATIENT)
Dept: CARDIAC REHAB | Facility: CLINIC | Age: 69
End: 2024-01-18
Payer: MEDICARE

## 2024-01-25 ENCOUNTER — APPOINTMENT (OUTPATIENT)
Dept: CARDIAC REHAB | Facility: CLINIC | Age: 69
End: 2024-01-25
Payer: MEDICARE

## 2024-01-26 ENCOUNTER — LAB (OUTPATIENT)
Dept: LAB | Facility: LAB | Age: 69
End: 2024-01-26
Payer: MEDICARE

## 2024-01-26 DIAGNOSIS — E78.5 HYPERLIPIDEMIA, UNSPECIFIED HYPERLIPIDEMIA TYPE: ICD-10-CM

## 2024-01-26 DIAGNOSIS — R97.20 BPH WITH ELEVATED PSA: ICD-10-CM

## 2024-01-26 DIAGNOSIS — N40.0 BPH WITH ELEVATED PSA: ICD-10-CM

## 2024-01-26 DIAGNOSIS — R97.20 ELEVATED PSA: ICD-10-CM

## 2024-01-26 PROCEDURE — 80053 COMPREHEN METABOLIC PANEL: CPT

## 2024-01-26 PROCEDURE — 84153 ASSAY OF PSA TOTAL: CPT

## 2024-01-26 PROCEDURE — 36415 COLL VENOUS BLD VENIPUNCTURE: CPT

## 2024-01-27 ENCOUNTER — LAB (OUTPATIENT)
Dept: LAB | Facility: LAB | Age: 69
End: 2024-01-27
Payer: MEDICARE

## 2024-01-27 DIAGNOSIS — E78.5 HYPERLIPIDEMIA, UNSPECIFIED HYPERLIPIDEMIA TYPE: ICD-10-CM

## 2024-01-27 LAB
ALBUMIN SERPL BCP-MCNC: 4.4 G/DL (ref 3.4–5)
ALP SERPL-CCNC: 35 U/L (ref 33–136)
ALT SERPL W P-5'-P-CCNC: 27 U/L (ref 10–52)
ANION GAP SERPL CALC-SCNC: 14 MMOL/L (ref 10–20)
AST SERPL W P-5'-P-CCNC: 24 U/L (ref 9–39)
BILIRUB SERPL-MCNC: 0.5 MG/DL (ref 0–1.2)
BUN SERPL-MCNC: 18 MG/DL (ref 6–23)
CALCIUM SERPL-MCNC: 10.1 MG/DL (ref 8.6–10.6)
CHLORIDE SERPL-SCNC: 104 MMOL/L (ref 98–107)
CHOLEST SERPL-MCNC: 185 MG/DL (ref 0–199)
CHOLESTEROL/HDL RATIO: 3.3
CO2 SERPL-SCNC: 28 MMOL/L (ref 21–32)
CREAT SERPL-MCNC: 1.04 MG/DL (ref 0.5–1.3)
EGFRCR SERPLBLD CKD-EPI 2021: 78 ML/MIN/1.73M*2
GLUCOSE SERPL-MCNC: 103 MG/DL (ref 74–99)
HDLC SERPL-MCNC: 55.6 MG/DL
LDLC SERPL CALC-MCNC: 103 MG/DL
NON HDL CHOLESTEROL: 129 MG/DL (ref 0–149)
POTASSIUM SERPL-SCNC: 4.1 MMOL/L (ref 3.5–5.3)
PROT SERPL-MCNC: 7.4 G/DL (ref 6.4–8.2)
PSA SERPL-MCNC: 7.67 NG/ML
SODIUM SERPL-SCNC: 142 MMOL/L (ref 136–145)
TRIGL SERPL-MCNC: 133 MG/DL (ref 0–149)
VLDL: 27 MG/DL (ref 0–40)

## 2024-01-27 PROCEDURE — 80061 LIPID PANEL: CPT

## 2024-01-27 PROCEDURE — 36415 COLL VENOUS BLD VENIPUNCTURE: CPT

## 2024-01-29 ENCOUNTER — TELEPHONE (OUTPATIENT)
Dept: PULMONOLOGY | Facility: HOSPITAL | Age: 69
End: 2024-01-29
Payer: COMMERCIAL

## 2024-01-29 DIAGNOSIS — J43.9 PULMONARY EMPHYSEMA, UNSPECIFIED EMPHYSEMA TYPE (MULTI): Primary | ICD-10-CM

## 2024-01-29 RX ORDER — ALBUTEROL SULFATE 0.83 MG/ML
2.5 SOLUTION RESPIRATORY (INHALATION) EVERY 4 HOURS PRN
COMMUNITY
Start: 2023-12-01 | End: 2024-01-29 | Stop reason: SDUPTHER

## 2024-01-29 RX ORDER — ALBUTEROL SULFATE 0.83 MG/ML
2.5 SOLUTION RESPIRATORY (INHALATION) EVERY 4 HOURS PRN
Qty: 75 ML | Refills: 5 | Status: SHIPPED | OUTPATIENT
Start: 2024-01-29 | End: 2024-03-04 | Stop reason: SDUPTHER

## 2024-02-28 ENCOUNTER — TELEMEDICINE (OUTPATIENT)
Dept: UROLOGY | Facility: HOSPITAL | Age: 69
End: 2024-02-28
Payer: COMMERCIAL

## 2024-02-28 DIAGNOSIS — R97.20 ELEVATED PSA: ICD-10-CM

## 2024-02-28 DIAGNOSIS — R97.20 ELEVATED PSA: Primary | ICD-10-CM

## 2024-02-28 PROCEDURE — 1126F AMNT PAIN NOTED NONE PRSNT: CPT | Performed by: UROLOGY

## 2024-02-28 PROCEDURE — 1036F TOBACCO NON-USER: CPT | Performed by: UROLOGY

## 2024-02-28 PROCEDURE — 99213 OFFICE O/P EST LOW 20 MIN: CPT | Performed by: UROLOGY

## 2024-02-28 NOTE — PROGRESS NOTES
HPI    The patient is a 68 y.o. male presenting on 12/14/23 as a new patient for an elevated PSA.     LUTS  Urinary symptoms include: happy with his urinary symptoms. Weak stream in AM, then strong.   No prior surgeries     Erections: works well without meds; both attaining and maintaining erection     Prostate Cancer Screening:   No family history of prostate cancer     1/25/24 - seen via th to discuss repeat PSA. Patient has not went to lab yet.    2/28/24 - seen back over [facility] with PSA.     Component      Latest Ref Rng 10/1/2018 8/24/2020   Prostate Specific Antigen,Screen      <=4.00 ng/mL 4.20 (H)      PSA      0.00 - 4.00 ng/mL   6.66 (H)       Component      Latest Ref Rng 6/8/2022 11/9/2023   Prostate Specific Antigen,Screen      <=4.00 ng/mL 5.64 (H)  10.24 (H)    PSA      0.00 - 4.00 ng/mL             Lab Results   Component Value Date    PSA 7.67 (H) 01/26/2024    PSA 6.66 (H) 08/24/2020         Current Medications:  Current Outpatient Medications   Medication Sig Dispense Refill    albuterol 2.5 mg /3 mL (0.083 %) nebulizer solution Take 3 mL (2.5 mg) by nebulization every 4 hours if needed for wheezing or shortness of breath. 75 mL 5    albuterol 90 mcg/actuation inhaler INHALE 1 TO 2 PUFFS BY MOUTH EVERY 4 TO 6 HOURS AS NEEDED 8.5 g 0    albuterol 90 mcg/actuation inhaler Inhale 2 puffs every 4 hours if needed for wheezing or shortness of breath. 18 g 3    atorvastatin (Lipitor) 40 mg tablet Take 1 tablet (40 mg) by mouth once daily. 90 tablet 3    budesonide (Pulmicort) 0.5 mg/2 mL nebulizer solution Inhale twice a day.      folic acid (Folvite) 1 mg tablet Take 1 tablet (1 mg) by mouth once daily.      hydroxychloroquine (Plaquenil) 200 mg tablet TAKE ONE TABLET BY MOUTH EVERY DAY 90 tablet 0    ipratropium (Atrovent) 0.02 % nebulizer solution Inhale.      ipratropium-albuteroL (Duo-Neb) 0.5-2.5 mg/3 mL nebulizer solution Take 3 mL by nebulization 4 times a day. As needed      multivitamin with iron  (DAILY MULTIPLE VITAMINS/IRON ORAL) Take 1 tablet by mouth once daily.      NON FORMULARY Patient with h/o advanced COPD, currently requiring supplemental oxygen,requires home nebulizzer for administration of bronchodilators ( Albuterol solution), for rescue therapy. I attest that this is a medically necessary treatment.      Trelegy Ellipta 200-62.5-25 mcg blister with device Inhale 1 puff once daily. 28 each 3     No current facility-administered medications for this visit.        Active Problems:  Felix Mancera is a 68 y.o. male with the following Problems and Medications.  Patient Active Problem List   Diagnosis    COPD (chronic obstructive pulmonary disease) (CMS/[facility])    Rheumatoid arthritis (CMS/HCC)    Acute lumbar radiculopathy    Chronic bronchitis (CMS/HCC)    Chronic dyspnea    Chronic pain of both shoulders    Combined form of age-related cataract, right eye    HLD (hyperlipidemia)    Combs sign present    Hx of decompressive lumbar laminectomy    Hyperopia with presbyopia    Lumbar pain    Lung nodules    Nicotine dependence    Polyarthritis    Rotator cuff tear    Tinea cruris    Tobacco abuse, in remission    Unexplained weight loss    Cervical spinal stenosis     Current Outpatient Medications   Medication Sig Dispense Refill    albuterol 2.5 mg /3 mL (0.083 %) nebulizer solution Take 3 mL (2.5 mg) by nebulization every 4 hours if needed for wheezing or shortness of breath. 75 mL 5    albuterol 90 mcg/actuation inhaler INHALE 1 TO 2 PUFFS BY MOUTH EVERY 4 TO 6 HOURS AS NEEDED 8.5 g 0    albuterol 90 mcg/actuation inhaler Inhale 2 puffs every 4 hours if needed for wheezing or shortness of breath. 18 g 3    atorvastatin (Lipitor) 40 mg tablet Take 1 tablet (40 mg) by mouth once daily. 90 tablet 3    budesonide (Pulmicort) 0.5 mg/2 mL nebulizer solution Inhale twice a day.      folic acid (Folvite) 1 mg tablet Take 1 tablet (1 mg) by mouth once daily.      hydroxychloroquine (Plaquenil) 200 mg  tablet TAKE ONE TABLET BY MOUTH EVERY DAY 90 tablet 0    ipratropium (Atrovent) 0.02 % nebulizer solution Inhale.      ipratropium-albuteroL (Duo-Neb) 0.5-2.5 mg/3 mL nebulizer solution Take 3 mL by nebulization 4 times a day. As needed      multivitamin with iron (DAILY MULTIPLE VITAMINS/IRON ORAL) Take 1 tablet by mouth once daily.      NON FORMULARY Patient with h/o advanced COPD, currently requiring supplemental oxygen,requires home nebulizzer for administration of bronchodilators ( Albuterol solution), for rescue therapy. I attest that this is a medically necessary treatment.      Trelegy Ellipta 200-62.5-25 mcg blister with device Inhale 1 puff once daily. 28 each 3     No current facility-administered medications for this visit.       PMH:  Past Medical History:   Diagnosis Date    Other specified health status     No pertinent past medical history       PSH:  Past Surgical History:   Procedure Laterality Date    OTHER SURGICAL HISTORY  01/28/2021    Neck surgery       FMH:  Family History   Problem Relation Name Age of Onset    Other (AMD) Mother      Hypertension Mother         SHx:  Social History     Tobacco Use    Smoking status: Never    Smokeless tobacco: Never       Allergies:  Allergies   Allergen Reactions    Other Unknown       Assessment/Plan  I reviewed the [facility] guideline on the early detection of prostate cancer with the patient. I discussed that PSA screening, or early detection, is done for the purpose of reducing prostate cancer mortality. I reviewed the possible causes for elevated PSA, including inflammation, infection, prostate cancer, etc.      His PSA is elevated from baseline and persistently elevated on follow up testing. Given his expected life expectancy over 15 years, I recommended we proceed with further evaluation with TRUS biopsy, either preceded by or MRI or without pre-biopsy MRI. I reviewed the literature regarding pre-biopsy MRI to increase the yield of clinically significant  prostate cancers on biopsy and potentially avoid an unnecessary biopsy.  We discussed the risks of biopsy including infection, bleeding, retention. We discussed the risk of transient hematuria, hematochezia, and hematospermia.      Will proceed with screening MRI and see back in 1mo over [facility] to review. Discussed oop cost.          Scribe Attestation  By signing my name below, I, Charlene Aragon Scrjge, attest that this documentation  has been prepared under the direction and in the presence of Felix Parrish MD.

## 2024-03-04 ENCOUNTER — TELEPHONE (OUTPATIENT)
Dept: PULMONOLOGY | Facility: HOSPITAL | Age: 69
End: 2024-03-04
Payer: COMMERCIAL

## 2024-03-04 DIAGNOSIS — J43.9 PULMONARY EMPHYSEMA, UNSPECIFIED EMPHYSEMA TYPE (MULTI): ICD-10-CM

## 2024-03-04 DIAGNOSIS — J44.9 CHRONIC OBSTRUCTIVE PULMONARY DISEASE, UNSPECIFIED COPD TYPE (MULTI): ICD-10-CM

## 2024-03-05 ENCOUNTER — HOSPITAL ENCOUNTER (OUTPATIENT)
Dept: RADIOLOGY | Facility: HOSPITAL | Age: 69
Discharge: HOME | End: 2024-03-05
Payer: COMMERCIAL

## 2024-03-05 DIAGNOSIS — R97.20 ELEVATED PSA: ICD-10-CM

## 2024-03-05 PROCEDURE — 6100000002 MR PROSTATE SCREENING SELF PAY EXAM

## 2024-03-06 RX ORDER — ALBUTEROL SULFATE 0.83 MG/ML
2.5 SOLUTION RESPIRATORY (INHALATION) EVERY 4 HOURS PRN
Qty: 225 ML | Refills: 2 | Status: SHIPPED | OUTPATIENT
Start: 2024-03-06 | End: 2024-03-12 | Stop reason: SDUPTHER

## 2024-03-06 RX ORDER — ALBUTEROL SULFATE 90 UG/1
2 AEROSOL, METERED RESPIRATORY (INHALATION) EVERY 4 HOURS PRN
Qty: 25.5 G | Refills: 2 | Status: SHIPPED | OUTPATIENT
Start: 2024-03-06 | End: 2024-03-12 | Stop reason: SDUPTHER

## 2024-03-11 DIAGNOSIS — Z00.00 HEALTHCARE MAINTENANCE: ICD-10-CM

## 2024-03-11 DIAGNOSIS — E78.5 HYPERLIPIDEMIA, UNSPECIFIED HYPERLIPIDEMIA TYPE: ICD-10-CM

## 2024-03-11 DIAGNOSIS — J44.1 COPD EXACERBATION (MULTI): Primary | ICD-10-CM

## 2024-03-11 RX ORDER — FLUTICASONE FUROATE, UMECLIDINIUM BROMIDE AND VILANTEROL TRIFENATATE 200; 62.5; 25 UG/1; UG/1; UG/1
1 POWDER RESPIRATORY (INHALATION)
Qty: 28 EACH | Refills: 3 | Status: SHIPPED | OUTPATIENT
Start: 2024-03-11

## 2024-03-11 RX ORDER — IPRATROPIUM BROMIDE 0.5 MG/2.5ML
0.5 SOLUTION RESPIRATORY (INHALATION) 4 TIMES DAILY
Qty: 75 ML | Refills: 3 | Status: SHIPPED | OUTPATIENT
Start: 2024-03-11 | End: 2024-03-15 | Stop reason: SDUPTHER

## 2024-03-11 RX ORDER — ALBUTEROL SULFATE 90 UG/1
2 AEROSOL, METERED RESPIRATORY (INHALATION) EVERY 4 HOURS PRN
Qty: 18 G | Refills: 3 | Status: SHIPPED | OUTPATIENT
Start: 2024-03-11 | End: 2025-03-11

## 2024-03-11 RX ORDER — ATORVASTATIN CALCIUM 40 MG/1
40 TABLET, FILM COATED ORAL DAILY
Qty: 90 TABLET | Refills: 3 | Status: SHIPPED | OUTPATIENT
Start: 2024-03-11 | End: 2024-03-12 | Stop reason: SDUPTHER

## 2024-03-11 RX ORDER — HYDROXYCHLOROQUINE SULFATE 200 MG/1
200 TABLET, FILM COATED ORAL DAILY
Qty: 90 TABLET | Refills: 1 | Status: SHIPPED | OUTPATIENT
Start: 2024-03-11 | End: 2024-03-12 | Stop reason: SDUPTHER

## 2024-03-12 DIAGNOSIS — J43.9 PULMONARY EMPHYSEMA, UNSPECIFIED EMPHYSEMA TYPE (MULTI): ICD-10-CM

## 2024-03-12 DIAGNOSIS — E78.5 HYPERLIPIDEMIA, UNSPECIFIED HYPERLIPIDEMIA TYPE: ICD-10-CM

## 2024-03-12 DIAGNOSIS — J44.9 CHRONIC OBSTRUCTIVE PULMONARY DISEASE, UNSPECIFIED COPD TYPE (MULTI): ICD-10-CM

## 2024-03-12 DIAGNOSIS — Z00.00 HEALTHCARE MAINTENANCE: ICD-10-CM

## 2024-03-13 RX ORDER — ALBUTEROL SULFATE 0.83 MG/ML
2.5 SOLUTION RESPIRATORY (INHALATION) EVERY 4 HOURS PRN
Qty: 225 ML | Refills: 2 | Status: SHIPPED | OUTPATIENT
Start: 2024-03-13

## 2024-03-13 RX ORDER — HYDROXYCHLOROQUINE SULFATE 200 MG/1
200 TABLET, FILM COATED ORAL DAILY
Qty: 90 TABLET | Refills: 1 | Status: SHIPPED | OUTPATIENT
Start: 2024-03-13

## 2024-03-13 RX ORDER — ATORVASTATIN CALCIUM 40 MG/1
40 TABLET, FILM COATED ORAL DAILY
Qty: 90 TABLET | Refills: 3 | Status: SHIPPED | OUTPATIENT
Start: 2024-03-13 | End: 2025-03-13

## 2024-03-13 RX ORDER — ALBUTEROL SULFATE 90 UG/1
2 AEROSOL, METERED RESPIRATORY (INHALATION) EVERY 4 HOURS PRN
Qty: 25.5 G | Refills: 2 | Status: SHIPPED | OUTPATIENT
Start: 2024-03-13

## 2024-03-15 DIAGNOSIS — J44.1 COPD EXACERBATION (MULTI): ICD-10-CM

## 2024-03-15 RX ORDER — IPRATROPIUM BROMIDE 0.5 MG/2.5ML
0.5 SOLUTION RESPIRATORY (INHALATION) 4 TIMES DAILY
Qty: 225 ML | Refills: 3 | Status: SHIPPED | OUTPATIENT
Start: 2024-03-15 | End: 2024-03-20 | Stop reason: SDUPTHER

## 2024-03-20 ENCOUNTER — TELEMEDICINE (OUTPATIENT)
Dept: UROLOGY | Facility: HOSPITAL | Age: 69
End: 2024-03-20
Payer: COMMERCIAL

## 2024-03-20 DIAGNOSIS — J44.1 COPD EXACERBATION (MULTI): ICD-10-CM

## 2024-03-20 DIAGNOSIS — R97.20 ELEVATED PSA: ICD-10-CM

## 2024-03-20 PROCEDURE — 1036F TOBACCO NON-USER: CPT | Performed by: UROLOGY

## 2024-03-20 PROCEDURE — 99213 OFFICE O/P EST LOW 20 MIN: CPT | Performed by: UROLOGY

## 2024-03-20 RX ORDER — IPRATROPIUM BROMIDE 0.5 MG/2.5ML
0.5 SOLUTION RESPIRATORY (INHALATION) 4 TIMES DAILY
Qty: 225 ML | Refills: 3 | Status: SHIPPED | OUTPATIENT
Start: 2024-03-20

## 2024-03-20 NOTE — PROGRESS NOTES
Virtual or Telephone Consent    An interactive audio and video telecommunication system which permits real time communications between the patient (at the originating site) and provider (at the distant site) was utilized to provide this telehealth service.   Verbal consent was requested and obtained from Felix Mancera on this date, 03/20/24 for a telehealth visit.     HPI  The patient is a 68 y.o. male presenting on 12/14/23 as a new patient for an elevated PSA.     LUTS  Urinary symptoms include: happy with his urinary symptoms. Weak stream in AM, then strong.   No prior surgeries     Erections: works well without meds; both attaining and maintaining erection     Prostate Cancer Screening:   No family history of prostate cancer     1/25/24 - seen via th to discuss repeat PSA. Patient has not went to lab yet.    2/28/24 - seen back over [facility] with PSA.    MR Prostate 3/5/24:  IMPRESSION:  *No evidence of clinically significant prostatic neoplasm.  *BPH changes of the transition zone. Diffuse non nodular  hypointensities within the peripheral zone, without evidence of  focally restricted diffusion which may reflect sequelae of prior  prostatitis. (PI-RADS 2).  *Prostate volume is 32.5g. PSA density is 0.24g.     3/20/24 - seen today via th to review prostate MRI results. Doing well.     Component      Latest Ref Rng 10/1/2018 8/24/2020   Prostate Specific Antigen,Screen      <=4.00 ng/mL 4.20 (H)      PSA      0.00 - 4.00 ng/mL   6.66 (H)       Component      Latest Ref Rng 6/8/2022 11/9/2023   Prostate Specific Antigen,Screen      <=4.00 ng/mL 5.64 (H)  10.24 (H)    PSA      0.00 - 4.00 ng/mL           Lab Results   Component Value Date    PSA 7.67 (H) 01/26/2024    PSA 6.66 (H) 08/24/2020         Current Medications:  Current Outpatient Medications   Medication Sig Dispense Refill    albuterol 2.5 mg /3 mL (0.083 %) nebulizer solution Take 3 mL (2.5 mg) by nebulization every 4 hours if needed for wheezing or  shortness of breath. 225 mL 2    albuterol 90 mcg/actuation inhaler Inhale 2 puffs every 4 hours if needed for wheezing or shortness of breath. 18 g 3    albuterol 90 mcg/actuation inhaler Inhale 2 puffs every 4 hours if needed for wheezing. 25.5 g 2    atorvastatin (Lipitor) 40 mg tablet Take 1 tablet (40 mg) by mouth once daily. 90 tablet 3    budesonide (Pulmicort) 0.5 mg/2 mL nebulizer solution Inhale twice a day.      folic acid (Folvite) 1 mg tablet Take 1 tablet (1 mg) by mouth once daily.      hydroxychloroquine (Plaquenil) 200 mg tablet Take 1 tablet (200 mg) by mouth once daily. 90 tablet 1    ipratropium (Atrovent) 0.02 % nebulizer solution Take 2.5 mL (0.5 mg) by nebulization 4 times a day. 225 mL 3    ipratropium-albuteroL (Duo-Neb) 0.5-2.5 mg/3 mL nebulizer solution Take 3 mL by nebulization 4 times a day. As needed      multivitamin with iron (DAILY MULTIPLE VITAMINS/IRON ORAL) Take 1 tablet by mouth once daily.      NON FORMULARY Patient with h/o advanced COPD, currently requiring supplemental oxygen,requires home nebulizzer for administration of bronchodilators ( Albuterol solution), for rescue therapy. I attest that this is a medically necessary treatment.      Trelegy Ellipta 200-62.5-25 mcg blister with device Inhale 1 puff once daily. 28 each 3     No current facility-administered medications for this visit.        Active Problems:  Felix Mancera is a 68 y.o. male with the following Problems and Medications.  Patient Active Problem List   Diagnosis    COPD (chronic obstructive pulmonary disease) (CMS/HCC)    Rheumatoid arthritis (CMS/HCC)    Acute lumbar radiculopathy    Chronic bronchitis (CMS/HCC)    Chronic dyspnea    Chronic pain of both shoulders    Combined form of age-related cataract, right eye    HLD (hyperlipidemia)    Combs sign present    Hx of decompressive lumbar laminectomy    Hyperopia with presbyopia    Lumbar pain    Lung nodules    Nicotine dependence    Polyarthritis     Rotator cuff tear    Tinea cruris    Tobacco abuse, in remission    Unexplained weight loss    Cervical spinal stenosis     Current Outpatient Medications   Medication Sig Dispense Refill    albuterol 2.5 mg /3 mL (0.083 %) nebulizer solution Take 3 mL (2.5 mg) by nebulization every 4 hours if needed for wheezing or shortness of breath. 225 mL 2    albuterol 90 mcg/actuation inhaler Inhale 2 puffs every 4 hours if needed for wheezing or shortness of breath. 18 g 3    albuterol 90 mcg/actuation inhaler Inhale 2 puffs every 4 hours if needed for wheezing. 25.5 g 2    atorvastatin (Lipitor) 40 mg tablet Take 1 tablet (40 mg) by mouth once daily. 90 tablet 3    budesonide (Pulmicort) 0.5 mg/2 mL nebulizer solution Inhale twice a day.      folic acid (Folvite) 1 mg tablet Take 1 tablet (1 mg) by mouth once daily.      hydroxychloroquine (Plaquenil) 200 mg tablet Take 1 tablet (200 mg) by mouth once daily. 90 tablet 1    ipratropium (Atrovent) 0.02 % nebulizer solution Take 2.5 mL (0.5 mg) by nebulization 4 times a day. 225 mL 3    ipratropium-albuteroL (Duo-Neb) 0.5-2.5 mg/3 mL nebulizer solution Take 3 mL by nebulization 4 times a day. As needed      multivitamin with iron (DAILY MULTIPLE VITAMINS/IRON ORAL) Take 1 tablet by mouth once daily.      NON FORMULARY Patient with h/o advanced COPD, currently requiring supplemental oxygen,requires home nebulizzer for administration of bronchodilators ( Albuterol solution), for rescue therapy. I attest that this is a medically necessary treatment.      Carlin Ellipta 200-62.5-25 mcg blister with device Inhale 1 puff once daily. 28 each 3     No current facility-administered medications for this visit.       PMH:  Past Medical History:   Diagnosis Date    Other specified health status     No pertinent past medical history       PSH:  Past Surgical History:   Procedure Laterality Date    OTHER SURGICAL HISTORY  01/28/2021    Neck surgery       FMH:  Family History   Problem  Relation Name Age of Onset    Other (AMD) Mother      Hypertension Mother         SHx:  Social History     Tobacco Use    Smoking status: Never    Smokeless tobacco: Never       Allergies:  Allergies   Allergen Reactions    Other Unknown     Assessment/Plan  We reviewed his recent MRI showing Pi-RADS 2 changes to his prostate. PSAd on the high side at 24%. We will continue to monitor his PSA for now. If his PSA rises further, we will then discuss a prostate biopsy given the 10-15% risk of a false neg MRI. Follow up in 3 months with PSA prior.      Scribe Attestation  By signing my name below, I, Paulino Sotomayor, attest that this documentation  has been prepared under the direction and in the presence of Felix Parrish MD.

## 2024-03-26 ENCOUNTER — HOSPITAL ENCOUNTER (OUTPATIENT)
Dept: RADIOLOGY | Facility: CLINIC | Age: 69
End: 2024-03-26
Payer: COMMERCIAL

## 2024-04-18 ENCOUNTER — TELEMEDICINE (OUTPATIENT)
Dept: PULMONOLOGY | Facility: CLINIC | Age: 69
End: 2024-04-18
Payer: MEDICARE

## 2024-04-18 ENCOUNTER — APPOINTMENT (OUTPATIENT)
Dept: PULMONOLOGY | Facility: CLINIC | Age: 69
End: 2024-04-18
Payer: MEDICARE

## 2024-04-18 DIAGNOSIS — J44.9 CHRONIC OBSTRUCTIVE PULMONARY DISEASE, UNSPECIFIED COPD TYPE (MULTI): ICD-10-CM

## 2024-04-18 DIAGNOSIS — Z87.891 HISTORY OF NICOTINE USE: Primary | ICD-10-CM

## 2024-04-18 PROCEDURE — 99212 OFFICE O/P EST SF 10 MIN: CPT | Performed by: STUDENT IN AN ORGANIZED HEALTH CARE EDUCATION/TRAINING PROGRAM

## 2024-04-18 ASSESSMENT — ENCOUNTER SYMPTOMS
EYES NEGATIVE: 1
EYES NEGATIVE: 1
COUGH: 1
CONSTITUTIONAL NEGATIVE: 1
ENDOCRINE NEGATIVE: 1
CONSTITUTIONAL NEGATIVE: 1
CARDIOVASCULAR NEGATIVE: 1
CARDIOVASCULAR NEGATIVE: 1
GASTROINTESTINAL NEGATIVE: 1
SHORTNESS OF BREATH: 0
GASTROINTESTINAL NEGATIVE: 1
COUGH: 0
ENDOCRINE NEGATIVE: 1
SHORTNESS OF BREATH: 1

## 2024-04-18 NOTE — PATIENT INSTRUCTIONS
Thank you for visiting the [facility] today.   Your breathing medications:   Tests:   Referrals:   Education in the clinic:   Vaccines:   Records   Return in   If you have questions or concerns, call [phone] (option 4)

## 2024-04-18 NOTE — PROGRESS NOTES
Department of Medicine I Division of Pulmonary, Critical Care, and Sleep Medicine   [id number] [place] [address] 6th Floor  [place] [id number]  Phone: [phone]  Fax: [phone]    History of Present Illness   Felix Mancera is a 68 y.o. male presenting for follow up of COPD.      4/18/2024 (virtual visit--phone call)   Been doing well since the last visit   Went to [place] and had symptoms when he came back --went to urgent care got prednisone and azithromycin   Did do pulmonary rehab   Family is planning to move back to [place]   A1AT level is normal       10/2023  Pulmonology Questionnaire (Submitted on 10/11/2023)  Chief Complaint: Primary symptoms  Do you experience frequent throat clearing?: Yes  Chronicity: chronic  When did you first notice your symptoms?: more than 1 year ago  How often do your symptoms occur?: intermittently  Since you first noticed this problem, how has it changed?: gradually improving  Which of the following makes your symptoms worse?: climbing stairs, strenuous activity, URI  Which of the following makes your symptoms better?: ipratropium    Has issues when he goes to his son's house in [place], [place]   Started pulmonary rehab a few months ago  Triggers: humid air,   Pulmonary medications: trelegy, duoneb (once a week)    Last visit June 2023     Since last visit on 5/4/23 with Dr. Jung, patient unfortunately had a repeat hospital admission for COPD exacerbation from 6/19 through 6/24/2023. He had presented with significant shortness of breath for 3 days and required BiPAP in the ED; briefly on Precedex and admitted to the MICU. He was transitioned to supplemental O2 and improved with his ABGs. There was low concern for bacterial pneumonia. Was eventually transitioned down to 2 L nasal cannula and transferred to the floor. Was continued on a steroid course extended to 7 days. Was referred to pulmonary rehab (Per chart review, patient had refused pulmonary rehab  previously). Was previously advised Roflumilast as part of a study (but was told it wouldn't’t be paid for and he didn't want to start paying for it himself). He never ended up starting on it. States he will be starting up pulmonary rehab soon. He has oxygen at home; which was originally given to him for some mild nocturnal hypoxia. Tried it for a couple months; stopped using it. He checks his SpO2 at home periodically at rest as well as after exertion. Can drop as low as 88-90%. Not putting oxygen on during the day. Using Trelegy 100 daily. Was hardly using his albuterol prior to going into the hospital. After coming home after this recent exacerbation, has only needed it about 1-2x. He has a chronic cough - worse in the AM. Mildly productive with white/clear sputum. Admits to fairly frequent wheezing. RAY is fairly significant - he can feel winded moving around his house. Denies SOB at rest. Has intermittent runny nose - takes OTC Mucinex (I advised switching to Cetirizine). Denies GERD. Denies Chest pain, palpitations, lower leg edema.  CAT Today: 17        Last visit 1/2023: started trelegy       #COPD  No alpha 1 antitrypsin testing done since his last visit, reminded patient today.   Mr. Mancera has started using trelegy and has no complaints with it. He has started working back at the [employer] with some work modifications and is very happy to be back working. He has only had to stop occasionally and otherwise modifies his work for his shoulder pain.   He had a cold 1 month ago but did not require any steroids or physician evaluation. He did use his albuterol a little during that time.   He does have a cough in the AM and seldom wheezes. He cites living in an old [place] house as part of the triggers for his symptoms.   He is not interested in pulmonary rehab.     #Nocturnal hypoxia  He was prescribed nocturnal oxygen but hates it and stopped using i. He reportst that he sleeps well, with no snoring, or daytime  fatigue.   He is not interested in a sleep medicine referral.       Past Medical History: COPD, RA, HLD, tobacco use  Surgical History: C spine  Social History: former smoker 35 pack years, quit 2012, [place],   Family History: father - COPD, emphysema, grandfather - emphysema    6MWT 326 m, 63%  PFT 1/5/23: FVC 91%, FEV 34%, ratio 28, %, gas trapping present, DLCO 50  Review of Systems  Review of Systems   Constitutional: Negative.    HENT: Negative.     Eyes: Negative.    Respiratory:  Negative for cough and shortness of breath.    Cardiovascular: Negative.    Gastrointestinal: Negative.    Endocrine: Negative.    Genitourinary: Negative.      All other review of systems are negative and/or non-contributory.    Past Medical History   He has a past medical history of Other specified health status.    Immunizations     Immunization History   Administered Date(s) Administered    Flu vaccine, quadrivalent, high-dose, preservative free, age 65y+ (FLUZONE) 10/13/2022    Influenza, Seasonal, Quadrivalent, Adjuvanted 10/10/2023    Moderna COVID-19 vaccine, bivalent, blue cap/gray label *Check age/dose* 10/17/2022    Moderna SARS-CoV-2 Vaccination 03/06/2021, 04/20/2022    Pfizer COVID-19 vaccine, Fall 2023, 12 years and older, (30mcg/0.3mL) 10/10/2023    Pfizer Purple Cap SARS-CoV-2 10/30/2021    Pneumococcal conjugate vaccine, 20-valent (PREVNAR 20) 06/08/2022    Pneumococcal polysaccharide vaccine, 23-valent, age 2 years and older (PNEUMOVAX 23) 08/03/2020, 01/05/2023    Tdap vaccine, age 7 year and older (BOOSTRIX, ADACEL) 05/28/2011, 01/26/2022       Medications and Allergies     Current Outpatient Medications   Medication Instructions    albuterol 90 mcg/actuation inhaler 2 puffs, inhalation, Every 4 hours PRN    albuterol 90 mcg/actuation inhaler 2 puffs, inhalation, Every 4 hours PRN    albuterol 2.5 mg, nebulization, Every 4 hours PRN    atorvastatin (LIPITOR) 40 mg, oral, Daily     "budesonide (Pulmicort) 0.5 mg/2 mL nebulizer solution inhalation, 2 times daily    folic acid (Folvite) 1 mg tablet 1 tablet, oral, Daily    hydroxychloroquine (PLAQUENIL) 200 mg, oral, Daily    ipratropium (ATROVENT) 0.5 mg, nebulization, 4 times daily    ipratropium-albuteroL (Duo-Neb) 0.5-2.5 mg/3 mL nebulizer solution 3 mL, nebulization, 4 times daily RT, As needed    multivitamin with iron (DAILY MULTIPLE VITAMINS/IRON ORAL) 1 tablet, oral, Daily    NON FORMULARY Patient with h/o advanced COPD, currently requiring supplemental oxygen,requires home nebulizzer for administration of bronchodilators ( Albuterol solution), for rescue therapy. I attest that this is a medically necessary treatment.    Trelegy Ellipta 200-62.5-25 mcg blister with device 1 puff, inhalation, Daily RT      Other    Social History   He reports that he has never smoked. He has never used smokeless tobacco. No history on file for alcohol use and drug use.    Smoking History: see HPI   Exposure/Job History: see HPI     Family History     Family History   Problem Relation Name Age of Onset    Other (AMD) Mother      Hypertension Mother             Surgical History   He has a past surgical history that includes Other surgical history (01/28/2021).  Cervical spine surgery    Physical Exam   There were no vitals taken for this visit.     Physical Exam  Constitutional:       Comments: Phone call visit           Results   Pulmonary Function Tests:      No results found for: \"FEV1\", \"RMC6COQS\"    Chest Radiograph:  XR chest 1 view 06/20/2023    Narrative  Interpreted By:  LIYAH PINO MD and GILDARDO WAY DO  MRN: [id number]  Patient Name: KIMBER TURK    STUDY:  CHEST 1 VIEW;  6/20/2023 11:05 am    INDICATION:  dyspnea, oxygen requirement .    COMPARISON:  Chest radiograph 06/19/2023 CT chest 08/12/2022.    ACCESSION NUMBER(S):  [id number]    ORDERING CLINICIAN:  CHAOG JAMESON    FINDINGS:  AP radiograph of the chest was " provided.    CARDIOMEDIASTINAL SILHOUETTE:  Cardiomediastinal silhouette is normal in size and configuration.    LUNGS:  Redemonstration of hyperinflated lungs with diffuse coarsened  interstitial markings, similar to prior. Bibasilar streaky opacities  favored to represent atelectasis. No new focal consolidation, pleural  effusion, pneumothorax.    ABDOMEN:  No remarkable upper abdominal findings.    BONES:  Remote rib fractures are again seen. Otherwise, no acute osseous  pathology.    Impression  Mild bibasilar atelectasis superimposed on chronic lung changes.  Otherwise, no new focal consolidation, pleural effusion, or  pneumothorax.    I personally reviewed the images/study and I agree with the findings  as stated above by resident physician, Dr. Daniel Schreiber. The  study was interpreted at [facility] in [facility].      Chest CT Scan:  CT lung screening low dose 09/01/2023    Narrative  Interpreted By:  ENRICO GROSSMAN MD  MRN: [id number]  Patient Name: KIMBER TURK    STUDY:  TH CT CHEST LOW DOSE FOR LUNG SCREENING WO CONTRAST;  9/1/2023 3:23 pm    INDICATION:  cough, dyspnea on exertion  R91.8: Lung nodules.    COMPARISON:  08/12/2022.    ACCESSION NUMBER(S):  [id number]    ORDERING CLINICIAN:  SARITHA VALDES    TECHNIQUE:  Helical data acquisition of the chest was obtained without IV  contrast material.  Images were reformatted in axial, coronal, and  sagittal planes.    FINDINGS:  LUNGS AND AIRWAYS:  The trachea and central airways are patent. No endobronchial lesion  is seen.    There is severe emphysematous changes. There is diffuse peribronchial  thickening.There is no focal consolidation, pleural effusion, or  pneumothorax.    There is a 3 mm right lower lobe parenchymal lung nodule unchanged.  There are no suspicious parenchymal lung nodules.    MEDIASTINUM AND ZACH, LOWER NECK AND AXILLA:  The visualized thyroid gland is within normal limits.  Scattered  mediastinal lymph nodes are felt to be reactive in nature  Esophagus appears within normal limits as seen.    HEART AND VESSELS:  There is mild atherosclerotic changes of the thoracic aorta.  Main pulmonary artery and its branches are normal in caliber.  There is mild coronary artery calcifications.  The cardiac chambers are not enlarged.  There is no pericardial effusion seen.    UPPER ABDOMEN:  The visualized subdiaphragmatic structures demonstrate no remarkable  findings.        CHEST WALL AND OSSEOUS STRUCTURES:  Chest wall is within normal limits.  No acute osseous pathology.There are no suspicious osseous lesions.    Impression  1. There are no suspicious parenchymal lung nodules. Recommend  continued 1 year low-dose chest CT for surveillance.        LUNG RADS CATEGORY:  Lung-RADS 2 (Benign Appearance or Indolent behavior): Continue with  annual screening in 12 months,  CT Chest Lung Ca Screen Initial  or Follow up LR1/LR2/Continuation of Screening Low Dose recommended  as per American College of Radiology Guidelines Lung-RADS Version  2022.       ECHO:  Echocardiogram     [facility], [address] [place] [address]  Tel [phone] and Fax [phone]    TRANSTHORACIC ECHOCARDIOGRAM REPORT      Patient Name:     KIMBER Godoy Physician:  [id number] G[address]LEGOS  Study Date:       12/12/2022          Referring           SARITHA VALDES  Physician:  MRN/PID:          [id number]            PCP:                Terrence Farley MD  Accession/Order#: [id number]        [facility]  Location:           Invasive  YOB: 1955            Fellow:  Gender:           M                   Nurse:  Admit Date:                           Sonographer:        Graciela Willson [facility]  Admission Status: Outpatient          Additional Staff:  Height:           172.72 cm           CC Report to:  Weight:           72.57 kg            Study Type:          Echocardiogram  BSA:              1.86 m2  Blood Pressure: 115 /77 mmHg    Diagnosis/ICD: R06.00-Dyspnea, unspecified  Indication:    Chronic dyspnea; COPD; RAY  Procedure/CPT: Echo Complete w Full [id number]    Patient History:  Smoker:            Former.  Pertinent History: Dyspnea and COPD.    Study Detail: The following Echo studies were performed: 2D, M-Mode, Doppler and  color flow. Technically challenging study due to prominent lung  artifact.      PHYSICIAN INTERPRETATION:  Left Ventricle: The left ventricular systolic function is normal. There are no regional wall motion abnormalities. The left ventricular cavity size is normal. Left ventricular diastolic filling was indeterminate.  Left Atrium: The left atrium was not well visualized.  Right Ventricle: The right ventricle is normal in size. There is normal right ventricular global systolic function.  Right Atrium: The right atrium is normal in size.  Aortic Valve: The aortic valve was not well visualized. There is indeterminate aortic valve regurgitation. The peak instantaneous gradient of the aortic valve is 5.3 mmHg.  Mitral Valve: The mitral valve is normal in structure. There is trace mitral valve regurgitation.  Tricuspid Valve: The tricuspid valve was not well visualized. There is trace tricuspid regurgitation. The right ventricular systolic pressure is unable to be estimated.  Pulmonic Valve: The pulmonic valve is structurally normal. There is no indication of pulmonic valve regurgitation.  Pericardium: There is no pericardial effusion noted.  Aorta: The aortic root was not well visualized.  Systemic Veins: The inferior vena cava size appears small.  In comparison to the previous echocardiogram(s): There are no prior studies on this patient for comparison purposes.      CONCLUSIONS:  1. Left ventricular systolic function is normal.  2. Poorly visualized anatomical structures due to suboptimal image quality.    QUANTITATIVE DATA  SUMMARY:  LA VOLUME:  Normal Ranges:  LA Vol A4C:        17.2 ml    (22+/-6mL/m2)  LA Vol A2C:        21.1 ml  LA Vol BP:         19.5 ml  LA Vol Index A4C:  9.3 ml/m2  LA Vol Index A2C:  11.3 ml/m2  LA Vol Index BP:   10.5 ml/m2  LA Area A4C:       9.0 cm2  LA Area A2C:       10.2 cm2  LA Major Axis A4C: 4.0 cm  LA Major Axis A2C: 4.2 cm  LA Volume Index:   10.5 ml/m2  LA Vol A4C:        15.3 ml  LA Vol A2C:        20.3 ml    M-MODE MEASUREMENTS:  Normal Ranges:  Ao Root: 3.10 cm (2.0-3.7cm)    LV SYSTOLIC FUNCTION BY 2D PLANIMETRY (MOD):  Normal Ranges:  EF-A4C View: 58.0 % (>=55%)  EF-A2C View: 62.6 %  EF-Biplane:  60.0 %    LV DIASTOLIC FUNCTION:  Normal Ranges:  MV Peak E:        0.78 m/s    (0.7-1.2 m/s)  MV Peak A:        0.72 m/s    (0.42-0.7 m/s)  E/A Ratio:        1.08        (1.0-2.2)  MV lateral e'     0.07 m/s  MV medial e'      0.06 m/s  MV A Dur:         106.11 msec  PulmV Sys Ze:    53.95 cm/s  PulmV Mckeon Ze:   37.44 cm/s  PulmV S/D Ze:    1.44  PulmV A Revs Ze: 30.86 cm/s  PulmV A Revs Dur: 85.35 msec    MITRAL VALVE:  Normal Ranges:  MV Vmax:    0.91 m/s (<=1.3m/s)  MV peak PG: 3.3 mmHg (<5mmHg)  MV mean P.5 mmHg (<2mmHg)  MV VTI:     18.81 cm (10-13cm)  MV DT:      215 msec (150-240msec)    AORTIC VALVE:  Normal Ranges:  AoV Vmax:      1.15 m/s (<=1.7m/s)  AoV Peak P.3 mmHg (<20mmHg)  LVOT Max Ze:  0.93 m/s (<=1.1m/s)  LVOT VTI:      17.31 cm  LVOT Diameter: 1.96 cm  (1.8-2.4cm)  AoV Area,Vmax: 2.44 cm2 (2.5-4.5cm2)    RIGHT VENTRICLE:  RV 1   2.7 cm  RV 2   2.2 cm  RV 3   6.0 cm  TAPSE: 21.0 mm    TRICUSPID VALVE/RVSP:  Normal Ranges:  Est. RA Pressure: 3 mmHg    PULMONIC VALVE:  Normal Ranges:  PV Accel Time: 104 msec (>120ms)  PV Max Ze:    1.0 m/s  (0.6-0.9m/s)  PV Max P.0 mmHg    Pulmonary Veins:  PulmV A Revs Dur: 85.35 msec  PulmV A Revs Ze: 30.86 cm/s  PulmV Mckeon Ze:   37.44 cm/s  PulmV S/D Ze:    1.44  PulmV Sys Ze:    53.95 cm/s      [id number] Bienvenido Valero  "DO  Electronically signed on 12/12/2022 at 5:25:10 PM        Final        Labs:  Lab Results   Component Value Date    WBC 8.7 11/09/2023    HGB 15.4 11/09/2023    HCT 45.4 11/09/2023    MCV 90 11/09/2023     11/09/2023       Lab Results   Component Value Date    CREATININE 1.04 01/26/2024    BUN 18 01/26/2024     01/26/2024    K 4.1 01/26/2024     01/26/2024    CO2 28 01/26/2024        Lab Results   Component Value Date    SHAHNAZ NEGATIVE 10/01/2019    RF 11 10/01/2018    SEDRATE 7 09/09/2019        IgE: No results found for: \"IGE\"   Respiratory Allergy Panel:  AEC:   Eosinophils Absolute (x10*3/uL)   Date Value   11/09/2023 0.28     Eosinophils % (%)   Date Value   11/09/2023 3.2       Cultures:  No results found for: \"AFBCX\"   No results found for: \"RESPCULTCYFI\"    No results found for the last 90 days.  C     Assessment and Plan        1. Severe COPD: GOLD 3E COPD--appears to have some environmental trigger--may be related to an exposures at his son's house in [place] or a virus he may have picked up while traveling/from [place]  - continue Trelegy 200 daily  - continue duoneb prn   - Continue albuterol HFA PRN  - at this time since there seems to be a temporal relationship between patient traveling to [place] and his COPD exacerbations (only happens when he visits his son there in the house)  would defer chronic zithromax or roflumilast--if has another exacerbation would recommend to discuss starting roflumilast (avoid zithromax since pt is already on hydroxychloriquine for RA). Advised pt to discuss with his family in [place] if there is some potential mold exposure in the home. Other consideration would be component of environmental allergies to ry that is common in [place] and/or respiratory virus   -We can also consider checking immunoglobulins if has repeated infections   -Since his family is moving out of the house in [place], will see if he has any further exacerbations "           Lung Nodules  -most recent scan 9/2023 with stable 3mm lung nodules  -recommend annual LDCT chest for lung cancer screening--next due in September 2024 --ordered            Follow-up:  6 months or sooner if needed     A total of 12 min was spent obtain history/reviewing symptoms and discussing plan of care with patient      Xiomara Guevara MD  04/18/2024

## 2024-04-18 NOTE — PROGRESS NOTES
Department of Medicine I Division of Pulmonary, Critical Care, and Sleep Medicine   [id number] [place] [address]  Phone: [phone]  Fax: [phone]    History of Present Illness   Felix Mancera is a 68 y.o. male presenting for follow up of COPD.    Referred by:  No ref. provider found, for COPD. I have independently interviewed and examined the patient in the office and reviewed available records.       Pulmonology Questionnaire (Submitted on 10/11/2023)  Chief Complaint: Primary symptoms  Do you experience frequent throat clearing?: Yes  Chronicity: chronic  When did you first notice your symptoms?: more than 1 year ago  How often do your symptoms occur?: intermittently  Since you first noticed this problem, how has it changed?: gradually improving  Which of the following makes your symptoms worse?: climbing stairs, strenuous activity, URI  Which of the following makes your symptoms better?: ipratropium    Has issues when he goes to his son's house in [place], [place]   Started pulmonary rehab a few months ago  Triggers: humid air,   Pulmonary medications: trelegy, duoneb (once a week)    Last visit June 2023     Since last visit on 5/4/23 with Dr. Jung, patient unfortunately had a repeat hospital admission for COPD exacerbation from 6/19 through 6/24/2023. He had presented with significant shortness of breath for 3 days and required BiPAP in the ED; briefly on Precedex and admitted to the MICU. He was transitioned to supplemental O2 and improved with his ABGs. There was low concern for bacterial pneumonia. Was eventually transitioned down to 2 L nasal cannula and transferred to the floor. Was continued on a steroid course extended to 7 days. Was referred to pulmonary rehab (Per chart review, patient had refused pulmonary rehab previously). Was previously advised Roflumilast as part of a study (but was told it wouldn't’t be paid for and he didn't want to start paying for  it himself). He never ended up starting on it. States he will be starting up pulmonary rehab soon. He has oxygen at home; which was originally given to him for some mild nocturnal hypoxia. Tried it for a couple months; stopped using it. He checks his SpO2 at home periodically at rest as well as after exertion. Can drop as low as 88-90%. Not putting oxygen on during the day. Using Trelegy 100 daily. Was hardly using his albuterol prior to going into the hospital. After coming home after this recent exacerbation, has only needed it about 1-2x. He has a chronic cough - worse in the AM. Mildly productive with white/clear sputum. Admits to fairly frequent wheezing. RAY is fairly significant - he can feel winded moving around his house. Denies SOB at rest. Has intermittent runny nose - takes OTC Mucinex (I advised switching to Cetirizine). Denies GERD. Denies Chest pain, palpitations, lower leg edema.  CAT Today: 17        Last visit 1/2023: started trelegy       #COPD  No alpha 1 antitrypsin testing done since his last visit, reminded patient today.   Mr. Mancera has started using trelegy and has no complaints with it. He has started working back at the [employer] with some work modifications and is very happy to be back working. He has only had to stop occasionally and otherwise modifies his work for his shoulder pain.   He had a cold 1 month ago but did not require any steroids or physician evaluation. He did use his albuterol a little during that time.   He does have a cough in the AM and seldom wheezes. He cites living in an old felicity house as part of the triggers for his symptoms.   He is not interested in pulmonary rehab.     #Nocturnal hypoxia  He was prescribed nocturnal oxygen but hates it and stopped using i. He reportst that he sleeps well, with no snoring, or daytime fatigue.   He is not interested in a sleep medicine referral.       Past Medical History: COPD, RA, HLD, tobacco use  Surgical History: C  spine  Social History: former smoker 35 pack years, quit 2012, [place],   Family History: father - COPD, emphysema, grandfather - emphysema    6MWT 326 m, 63%  PFT 1/5/23: FVC 91%, FEV 34%, ratio 28, %, gas trapping present, DLCO 50  Review of Systems  Review of Systems   Constitutional: Negative.    HENT: Negative.     Eyes: Negative.    Respiratory:  Positive for cough and shortness of breath.    Cardiovascular: Negative.    Gastrointestinal: Negative.    Endocrine: Negative.    Genitourinary: Negative.    Musculoskeletal:         + joint pains     All other review of systems are negative and/or non-contributory.    Past Medical History   He has a past medical history of Other specified health status.    Immunizations     Immunization History   Administered Date(s) Administered    Flu vaccine, quadrivalent, high-dose, preservative free, age 65y+ (FLUZONE) 10/13/2022    Influenza, Seasonal, Quadrivalent, Adjuvanted 10/10/2023    Moderna COVID-19 vaccine, bivalent, blue cap/gray label *Check age/dose* 10/17/2022    Moderna SARS-CoV-2 Vaccination 03/06/2021, 04/20/2022    Pfizer COVID-19 vaccine, Fall 2023, 12 years and older, (30mcg/0.3mL) 10/10/2023    Pfizer Purple Cap SARS-CoV-2 10/30/2021    Pneumococcal conjugate vaccine, 20-valent (PREVNAR 20) 06/08/2022    Pneumococcal polysaccharide vaccine, 23-valent, age 2 years and older (PNEUMOVAX 23) 08/03/2020, 01/05/2023    Tdap vaccine, age 7 year and older (BOOSTRIX, ADACEL) 05/28/2011, 01/26/2022       Medications and Allergies     Current Outpatient Medications   Medication Instructions    albuterol 90 mcg/actuation inhaler 2 puffs, inhalation, Every 4 hours PRN    albuterol 90 mcg/actuation inhaler 2 puffs, inhalation, Every 4 hours PRN    albuterol 2.5 mg, nebulization, Every 4 hours PRN    atorvastatin (LIPITOR) 40 mg, oral, Daily    budesonide (Pulmicort) 0.5 mg/2 mL nebulizer solution inhalation, 2 times daily    folic acid (Folvite) 1  "mg tablet 1 tablet, oral, Daily    hydroxychloroquine (PLAQUENIL) 200 mg, oral, Daily    ipratropium (ATROVENT) 0.5 mg, nebulization, 4 times daily    ipratropium-albuteroL (Duo-Neb) 0.5-2.5 mg/3 mL nebulizer solution 3 mL, nebulization, 4 times daily RT, As needed    multivitamin with iron (DAILY MULTIPLE VITAMINS/IRON ORAL) 1 tablet, oral, Daily    NON FORMULARY Patient with h/o advanced COPD, currently requiring supplemental oxygen,requires home nebulizzer for administration of bronchodilators ( Albuterol solution), for rescue therapy. I attest that this is a medically necessary treatment.    Trelegy Ellipta 200-62.5-25 mcg blister with device 1 puff, inhalation, Daily RT      Other    Social History   He reports that he has never smoked. He has never used smokeless tobacco. No history on file for alcohol use and drug use.    Smoking History: see HPI   Exposure/Job History: see HPI     Family History     Family History   Problem Relation Name Age of Onset    Other (AMD) Mother      Hypertension Mother             Surgical History   He has a past surgical history that includes Other surgical history (01/28/2021).  Cervical spine surgery    Physical Exam   There were no vitals taken for this visit.     Physical Exam  Constitutional:       Appearance: Normal appearance.   HENT:      Head: Normocephalic and atraumatic.   Eyes:      Pupils: Pupils are equal, round, and reactive to light.   Cardiovascular:      Rate and Rhythm: Normal rate and regular rhythm.   Pulmonary:      Effort: Pulmonary effort is normal.      Breath sounds: Normal breath sounds.   Skin:     Findings: No rash.   Neurological:      General: No focal deficit present.      Mental Status: He is alert and oriented to person, place, and time.   Psychiatric:         Mood and Affect: Mood normal.          Results   Pulmonary Function Tests:      No results found for: \"FEV1\", \"[id number]\"    Chest Radiograph:  XR chest 1 view " 06/20/2023    Narrative  Interpreted By:  LIYAH PINO MD and GILDARDO WAY DO  MRN: [id number]  Patient Name: KIMBER TURK    STUDY:  CHEST 1 VIEW;  6/20/2023 11:05 am    INDICATION:  dyspnea, oxygen requirement .    COMPARISON:  Chest radiograph 06/19/2023 CT chest 08/12/2022.    ACCESSION NUMBER(S):  [id number]    ORDERING CLINICIAN:  CHAGO JAMESON    FINDINGS:  AP radiograph of the chest was provided.    CARDIOMEDIASTINAL SILHOUETTE:  Cardiomediastinal silhouette is normal in size and configuration.    LUNGS:  Redemonstration of hyperinflated lungs with diffuse coarsened  interstitial markings, similar to prior. Bibasilar streaky opacities  favored to represent atelectasis. No new focal consolidation, pleural  effusion, pneumothorax.    ABDOMEN:  No remarkable upper abdominal findings.    BONES:  Remote rib fractures are again seen. Otherwise, no acute osseous  pathology.    Impression  Mild bibasilar atelectasis superimposed on chronic lung changes.  Otherwise, no new focal consolidation, pleural effusion, or  pneumothorax.    I personally reviewed the images/study and I agree with the findings  as stated above by resident physician, Dr. Daniel Schreiber. The  study was interpreted at [facility] in [facility].      Chest CT Scan:  CT lung screening low dose 09/01/2023    Narrative  Interpreted By:  ENRICO GROSSMAN MD  MRN: [id number]  Patient Name: KIMBER TURK    STUDY:  TH CT CHEST LOW DOSE FOR LUNG SCREENING WO CONTRAST;  9/1/2023 3:23 pm    INDICATION:  cough, dyspnea on exertion  R91.8: Lung nodules.    COMPARISON:  08/12/2022.    ACCESSION NUMBER(S):  [id number]    ORDERING CLINICIAN:  SARITHA VALDES    TECHNIQUE:  Helical data acquisition of the chest was obtained without IV  contrast material.  Images were reformatted in axial, coronal, and  sagittal planes.    FINDINGS:  LUNGS AND AIRWAYS:  The trachea and central airways are patent. No endobronchial  lesion  is seen.    There is severe emphysematous changes. There is diffuse peribronchial  thickening.There is no focal consolidation, pleural effusion, or  pneumothorax.    There is a 3 mm right lower lobe parenchymal lung nodule unchanged.  There are no suspicious parenchymal lung nodules.    MEDIASTINUM AND ZACH, LOWER NECK AND AXILLA:  The visualized thyroid gland is within normal limits.  Scattered mediastinal lymph nodes are felt to be reactive in nature  Esophagus appears within normal limits as seen.    HEART AND VESSELS:  There is mild atherosclerotic changes of the thoracic aorta.  Main pulmonary artery and its branches are normal in caliber.  There is mild coronary artery calcifications.  The cardiac chambers are not enlarged.  There is no pericardial effusion seen.    UPPER ABDOMEN:  The visualized subdiaphragmatic structures demonstrate no remarkable  findings.        CHEST WALL AND OSSEOUS STRUCTURES:  Chest wall is within normal limits.  No acute osseous pathology.There are no suspicious osseous lesions.    Impression  1. There are no suspicious parenchymal lung nodules. Recommend  continued 1 year low-dose chest CT for surveillance.        LUNG RADS CATEGORY:  Lung-RADS 2 (Benign Appearance or Indolent behavior): Continue with  annual screening in 12 months,  CT Chest Lung Ca Screen Initial  or Follow up LR1/LR2/Continuation of Screening Low Dose recommended  as per American College of Radiology Guidelines Lung-RADS Version  2022.       ECHO:  Echocardiogram     [facility], [address] [place] [address]  Tel [phone] and Fax [phone]    TRANSTHORACIC ECHOCARDIOGRAM REPORT      Patient Name:     KIMBER Godoy Physician:  [id number] GALLEGOS  Study Date:       12/12/2022          Referring           SARITHA VALDES  Physician:  MRN/PID:          [id number]            PCP:                Terrence Farley MD  Accession/Order#:  [id number]        Department          [facility]  Location:           Invasive  YOB: 1955            Fellow:  Gender:           M                   Nurse:  Admit Date:                           Sonographer:        Graciela Willson [facility]  Admission Status: Outpatient          Additional Staff:  Height:           172.72 cm           CC Report to:  Weight:           72.57 kg            Study Type:         Echocardiogram  BSA:              1.86 m2  Blood Pressure: 115 /77 mmHg    Diagnosis/ICD: R06.00-Dyspnea, unspecified  Indication:    Chronic dyspnea; COPD; RAY  Procedure/CPT: Echo Complete w Full [id number]    Patient History:  Smoker:            Former.  Pertinent History: Dyspnea and COPD.    Study Detail: The following Echo studies were performed: 2D, M-Mode, Doppler and  color flow. Technically challenging study due to prominent lung  artifact.      PHYSICIAN INTERPRETATION:  Left Ventricle: The left ventricular systolic function is normal. There are no regional wall motion abnormalities. The left ventricular cavity size is normal. Left ventricular diastolic filling was indeterminate.  Left Atrium: The left atrium was not well visualized.  Right Ventricle: The right ventricle is normal in size. There is normal right ventricular global systolic function.  Right Atrium: The right atrium is normal in size.  Aortic Valve: The aortic valve was not well visualized. There is indeterminate aortic valve regurgitation. The peak instantaneous gradient of the aortic valve is 5.3 mmHg.  Mitral Valve: The mitral valve is normal in structure. There is trace mitral valve regurgitation.  Tricuspid Valve: The tricuspid valve was not well visualized. There is trace tricuspid regurgitation. The right ventricular systolic pressure is unable to be estimated.  Pulmonic Valve: The pulmonic valve is structurally normal. There is no indication of pulmonic valve regurgitation.  Pericardium: There is no pericardial  effusion noted.  Aorta: The aortic root was not well visualized.  Systemic Veins: The inferior vena cava size appears small.  In comparison to the previous echocardiogram(s): There are no prior studies on this patient for comparison purposes.      CONCLUSIONS:  1. Left ventricular systolic function is normal.  2. Poorly visualized anatomical structures due to suboptimal image quality.    QUANTITATIVE DATA SUMMARY:  LA VOLUME:  Normal Ranges:  LA Vol A4C:        17.2 ml    (22+/-6mL/m2)  LA Vol A2C:        21.1 ml  LA Vol BP:         19.5 ml  LA Vol Index A4C:  9.3 ml/m2  LA Vol Index A2C:  11.3 ml/m2  LA Vol Index BP:   10.5 ml/m2  LA Area A4C:       9.0 cm2  LA Area A2C:       10.2 cm2  LA Major Axis A4C: 4.0 cm  LA Major Axis A2C: 4.2 cm  LA Volume Index:   10.5 ml/m2  LA Vol A4C:        15.3 ml  LA Vol A2C:        20.3 ml    M-MODE MEASUREMENTS:  Normal Ranges:  Ao Root: 3.10 cm (2.0-3.7cm)    LV SYSTOLIC FUNCTION BY 2D PLANIMETRY (MOD):  Normal Ranges:  EF-A4C View: 58.0 % (>=55%)  EF-A2C View: 62.6 %  EF-Biplane:  60.0 %    LV DIASTOLIC FUNCTION:  Normal Ranges:  MV Peak E:        0.78 m/s    (0.7-1.2 m/s)  MV Peak A:        0.72 m/s    (0.42-0.7 m/s)  E/A Ratio:        1.08        (1.0-2.2)  MV lateral e'     0.07 m/s  MV medial e'      0.06 m/s  MV A Dur:         106.11 msec  PulmV Sys Ze:    53.95 cm/s  PulmV Mckeon Ze:   37.44 cm/s  PulmV S/D Ze:    1.44  PulmV A Revs Ze: 30.86 cm/s  PulmV A Revs Dur: 85.35 msec    MITRAL VALVE:  Normal Ranges:  MV Vmax:    0.91 m/s (<=1.3m/s)  MV peak PG: 3.3 mmHg (<5mmHg)  MV mean P.5 mmHg (<2mmHg)  MV VTI:     18.81 cm (10-13cm)  MV DT:      215 msec (150-240msec)    AORTIC VALVE:  Normal Ranges:  AoV Vmax:      1.15 m/s (<=1.7m/s)  AoV Peak P.3 mmHg (<20mmHg)  LVOT Max Ze:  0.93 m/s (<=1.1m/s)  LVOT VTI:      17.31 cm  LVOT Diameter: 1.96 cm  (1.8-2.4cm)  AoV Area,Vmax: 2.44 cm2 (2.5-4.5cm2)    RIGHT VENTRICLE:  RV 1   2.7 cm  RV 2   2.2 cm  RV 3   6.0  "cm  TAPSE: 21.0 mm    TRICUSPID VALVE/RVSP:  Normal Ranges:  Est. RA Pressure: 3 mmHg    PULMONIC VALVE:  Normal Ranges:  PV Accel Time: 104 msec (>120ms)  PV Max Ze:    1.0 m/s  (0.6-0.9m/s)  PV Max P.0 mmHg    Pulmonary Veins:  PulmV A Revs Dur: 85.35 msec  PulmV A Revs Ze: 30.86 cm/s  PulmV Mckeon Ze:   37.44 cm/s  PulmV S/D Ze:    1.44  PulmV Sys Ze:    53.95 cm/s      [id number] Bienvenido Valero DO  Electronically signed on 2022 at 5:25:10 PM        Final        Labs:  Lab Results   Component Value Date    WBC 8.7 2023    HGB 15.4 2023    HCT 45.4 2023    MCV 90 2023     2023       Lab Results   Component Value Date    CREATININE 1.04 2024    BUN 18 2024     2024    K 4.1 2024     2024    CO2 28 2024        Lab Results   Component Value Date    SHAHNAZ NEGATIVE 10/01/2019    RF 11 10/01/2018    SEDRATE 7 2019        IgE: No results found for: \"IGE\"   Respiratory Allergy Panel:  AEC:   Eosinophils Absolute (x10*3/uL)   Date Value   2023 0.28     Eosinophils % (%)   Date Value   2023 3.2       Cultures:  No results found for: \"AFBCX\"   No results found for: \"RESPCULTCYFI\"    No results found for the last 90 days.  C     Assessment and Plan        1. Severe COPD: GOLD 3E COPD--appears to have some environmental trigger--may be related to an exposure at his son's house in [place] or a virus he may have picked up while traveling/from [employer]  - continue Trelegy 200 daily  - continue duoneb prn   - Continue albuterol HFA PRN  - at this time since there seems to be a temporal relationship between patient traveling to [place] and his COPD exacerbations would defer chronic zithromax or roflumilast--if has another exacerbation would recommend to discuss starting roflumilast (avoid zithromax since pt is already on hydroxychloriquine for RA). Advised pt to discuss with his family in [place] if there is some " potential mold exposure in the home. Other consideration would be component of environmental allergies to ry that is common in [place] and/or respiratory virus   -We can also consider checking immunoglobulins if has repeated infections   -continue pulmonary rehab   -A1AT testing         Lung Nodules  -most recent scan 9/2023 with stable 3mm lung nodules  -recommend annual LDCT chest for lung cancer screening--next due in September 2024             Follow-up:  6 months or sooner if needed     Xiomara Guevara MD  04/18/2024

## 2024-05-22 DIAGNOSIS — E78.5 HYPERLIPIDEMIA, UNSPECIFIED HYPERLIPIDEMIA TYPE: ICD-10-CM

## 2024-05-22 DIAGNOSIS — Z00.00 HEALTHCARE MAINTENANCE: ICD-10-CM

## 2024-05-22 DIAGNOSIS — J43.9 PULMONARY EMPHYSEMA, UNSPECIFIED EMPHYSEMA TYPE (MULTI): ICD-10-CM

## 2024-06-03 ENCOUNTER — LAB (OUTPATIENT)
Dept: LAB | Facility: LAB | Age: 69
End: 2024-06-03
Payer: MEDICARE

## 2024-06-03 DIAGNOSIS — R97.20 ELEVATED PSA: ICD-10-CM

## 2024-06-03 LAB — PSA SERPL-MCNC: 10.17 NG/ML

## 2024-06-03 PROCEDURE — 84153 ASSAY OF PSA TOTAL: CPT

## 2024-06-03 PROCEDURE — 36415 COLL VENOUS BLD VENIPUNCTURE: CPT

## 2024-06-19 ENCOUNTER — TELEMEDICINE (OUTPATIENT)
Dept: UROLOGY | Facility: HOSPITAL | Age: 69
End: 2024-06-19
Payer: MEDICARE

## 2024-06-19 DIAGNOSIS — R97.20 ELEVATED PSA: Primary | ICD-10-CM

## 2024-06-19 PROCEDURE — 99214 OFFICE O/P EST MOD 30 MIN: CPT | Performed by: UROLOGY

## 2024-06-19 NOTE — PROGRESS NOTES
HPI    68 y.o. male being seen with the following problem list:    Problem list:  Elevated PSA     1/25/24 - seen via th to discuss repeat PSA. Patient has not went to lab yet.     2/28/24 - seen back over [facility] with PSA.     MR Prostate 3/5/24:  IMPRESSION:  *No evidence of clinically significant prostatic neoplasm.  *BPH changes of the transition zone. Diffuse non nodular  hypointensities within the peripheral zone, without evidence of  focally restricted diffusion which may reflect sequelae of prior  prostatitis. (PI-RADS 2).  *Prostate volume is 32.5g. PSA density is 0.24g.      3/20/24 - seen today via th to review prostate MRI results. Doing well.    06/19/24 - seen back with PSA prior. No interval changes      Lab Results   Component Value Date    PSA 10.17 (H) 06/03/2024    PSA 7.67 (H) 01/26/2024    PSA 6.66 (H) 08/24/2020         Current Medications:  Current Outpatient Medications   Medication Sig Dispense Refill    albuterol 2.5 mg /3 mL (0.083 %) nebulizer solution Take 3 mL (2.5 mg) by nebulization every 4 hours if needed for wheezing or shortness of breath. 225 mL 2    albuterol 90 mcg/actuation inhaler Inhale 2 puffs every 4 hours if needed for wheezing or shortness of breath. 18 g 3    albuterol 90 mcg/actuation inhaler Inhale 2 puffs every 4 hours if needed for wheezing. 25.5 g 2    atorvastatin (Lipitor) 40 mg tablet Take 1 tablet (40 mg) by mouth once daily. 90 tablet 3    budesonide (Pulmicort) 0.5 mg/2 mL nebulizer solution Inhale twice a day.      folic acid (Folvite) 1 mg tablet Take 1 tablet (1 mg) by mouth once daily.      hydroxychloroquine (Plaquenil) 200 mg tablet Take 1 tablet (200 mg) by mouth once daily. 90 tablet 1    ipratropium (Atrovent) 0.02 % nebulizer solution Take 2.5 mL (0.5 mg) by nebulization 4 times a day. 225 mL 3    ipratropium-albuteroL (Duo-Neb) 0.5-2.5 mg/3 mL nebulizer solution Take 3 mL by nebulization 4 times a day. As needed      multivitamin with iron (DAILY  MULTIPLE VITAMINS/IRON ORAL) Take 1 tablet by mouth once daily.      NON FORMULARY Patient with h/o advanced COPD, currently requiring supplemental oxygen,requires home nebulizzer for administration of bronchodilators ( Albuterol solution), for rescue therapy. I attest that this is a medically necessary treatment.      Trelegy Ellipta 200-62.5-25 mcg blister with device Inhale 1 puff once daily. 28 each 3     No current facility-administered medications for this visit.        Active Problems:  Felix Mancera is a 68 y.o. male with the following Problems and Medications.  Patient Active Problem List   Diagnosis    COPD (chronic obstructive pulmonary disease) (Multi)    Rheumatoid arthritis (Multi)    Acute lumbar radiculopathy    Chronic bronchitis (Multi)    Chronic dyspnea    Chronic pain of both shoulders    Combined form of age-related cataract, right eye    HLD (hyperlipidemia)    Combs sign present    Hx of decompressive lumbar laminectomy    Hyperopia with presbyopia    Lumbar pain    Lung nodules    Nicotine dependence    Polyarthritis    Rotator cuff tear    Tinea cruris    Tobacco abuse, in remission    Unexplained weight loss    Cervical spinal stenosis     Current Outpatient Medications   Medication Sig Dispense Refill    albuterol 2.5 mg /3 mL (0.083 %) nebulizer solution Take 3 mL (2.5 mg) by nebulization every 4 hours if needed for wheezing or shortness of breath. 225 mL 2    albuterol 90 mcg/actuation inhaler Inhale 2 puffs every 4 hours if needed for wheezing or shortness of breath. 18 g 3    albuterol 90 mcg/actuation inhaler Inhale 2 puffs every 4 hours if needed for wheezing. 25.5 g 2    atorvastatin (Lipitor) 40 mg tablet Take 1 tablet (40 mg) by mouth once daily. 90 tablet 3    budesonide (Pulmicort) 0.5 mg/2 mL nebulizer solution Inhale twice a day.      folic acid (Folvite) 1 mg tablet Take 1 tablet (1 mg) by mouth once daily.      hydroxychloroquine (Plaquenil) 200 mg tablet Take 1  tablet (200 mg) by mouth once daily. 90 tablet 1    ipratropium (Atrovent) 0.02 % nebulizer solution Take 2.5 mL (0.5 mg) by nebulization 4 times a day. 225 mL 3    ipratropium-albuteroL (Duo-Neb) 0.5-2.5 mg/3 mL nebulizer solution Take 3 mL by nebulization 4 times a day. As needed      multivitamin with iron (DAILY MULTIPLE VITAMINS/IRON ORAL) Take 1 tablet by mouth once daily.      NON FORMULARY Patient with h/o advanced COPD, currently requiring supplemental oxygen,requires home nebulizzer for administration of bronchodilators ( Albuterol solution), for rescue therapy. I attest that this is a medically necessary treatment.      Trelegy Ellipta 200-62.5-25 mcg blister with device Inhale 1 puff once daily. 28 each 3     No current facility-administered medications for this visit.       PMH:  Past Medical History:   Diagnosis Date    Other specified health status     No pertinent past medical history       PSH:  Past Surgical History:   Procedure Laterality Date    OTHER SURGICAL HISTORY  01/28/2021    Neck surgery       FMH:  Family History   Problem Relation Name Age of Onset    Other (AMD) Mother      Hypertension Mother         SHx:  Social History     Tobacco Use    Smoking status: Never    Smokeless tobacco: Never       Allergies:  Allergies   Allergen Reactions    Other Unknown     Assessment/Plan  PSA increased further, MRI 3/2024 neg but with a non-reassuring PSAd. Given rise in PSA, PSAd of 25-30%, potential false neg rate of 10-15% recommend a office transrectal prostate biopsy. Discussed risks of bleeding, infection, discomfort. He would like to proceed. Will book accordingly.        Scribe Attestation  By signing my name below, I, Paulino Sotomayor, attest that this documentation  has been prepared under the direction and in the presence of Felix Parrish MD.

## 2024-06-21 ENCOUNTER — APPOINTMENT (OUTPATIENT)
Dept: UROLOGY | Facility: HOSPITAL | Age: 69
End: 2024-06-21
Payer: COMMERCIAL

## 2024-07-17 ENCOUNTER — PROCEDURE VISIT (OUTPATIENT)
Dept: UROLOGY | Facility: HOSPITAL | Age: 69
End: 2024-07-17
Payer: MEDICARE

## 2024-07-17 DIAGNOSIS — R97.20 ELEVATED PSA: ICD-10-CM

## 2024-07-17 PROCEDURE — 76942 ECHO GUIDE FOR BIOPSY: CPT | Performed by: UROLOGY

## 2024-07-17 PROCEDURE — 96372 THER/PROPH/DIAG INJ SC/IM: CPT | Performed by: UROLOGY

## 2024-07-17 PROCEDURE — 76872 US TRANSRECTAL: CPT | Performed by: UROLOGY

## 2024-07-17 PROCEDURE — 55700 PR PROSTATE NEEDLE BIOPSY ANY APPROACH: CPT | Performed by: UROLOGY

## 2024-07-17 PROCEDURE — 64450 NJX AA&/STRD OTHER PN/BRANCH: CPT | Performed by: UROLOGY

## 2024-07-17 PROCEDURE — 55700 HC BIOPSY OF PROSTATE,NEEDLE/PUNCH: CPT | Performed by: UROLOGY

## 2024-07-17 PROCEDURE — 2500000004 HC RX 250 GENERAL PHARMACY W/ HCPCS (ALT 636 FOR OP/ED): Performed by: UROLOGY

## 2024-07-17 RX ORDER — CEFTRIAXONE 1 G/1
1 INJECTION, POWDER, FOR SOLUTION INTRAMUSCULAR; INTRAVENOUS ONCE
Status: COMPLETED | OUTPATIENT
Start: 2024-07-17 | End: 2024-07-17

## 2024-07-17 NOTE — PROGRESS NOTES
HPI    69 y.o. male being seen with the following problem list:    Problem list:  Elevated PSA     1/25/24 - seen via th to discuss repeat PSA. Patient has not went to lab yet.     2/28/24 - seen back over [facility] with PSA.     MR Prostate 3/5/24:  IMPRESSION:  *No evidence of clinically significant prostatic neoplasm.  *BPH changes of the transition zone. Diffuse non nodular  hypointensities within the peripheral zone, without evidence of  focally restricted diffusion which may reflect sequelae of prior  prostatitis. (PI-RADS 2).  *Prostate volume is 32.5g. PSA density is 0.24g.      3/20/24 - seen today via th to review prostate MRI results. Doing well.     06/19/24 - seen back with PSA prior. No interval changes    07/17/24 - here for TRUS bx. 1g IM CTX received 30 mins prior. R/b/a discussed.    Lab Results   Component Value Date    PSA 10.17 (H) 06/03/2024    PSA 7.67 (H) 01/26/2024    PSA 6.66 (H) 08/24/2020         Current Medications:  Current Outpatient Medications   Medication Sig Dispense Refill    albuterol 2.5 mg /3 mL (0.083 %) nebulizer solution Take 3 mL (2.5 mg) by nebulization every 4 hours if needed for wheezing or shortness of breath. 225 mL 2    albuterol 90 mcg/actuation inhaler Inhale 2 puffs every 4 hours if needed for wheezing or shortness of breath. 18 g 3    albuterol 90 mcg/actuation inhaler Inhale 2 puffs every 4 hours if needed for wheezing. 25.5 g 2    atorvastatin (Lipitor) 40 mg tablet Take 1 tablet (40 mg) by mouth once daily. 90 tablet 3    budesonide (Pulmicort) 0.5 mg/2 mL nebulizer solution Inhale twice a day.      folic acid (Folvite) 1 mg tablet Take 1 tablet (1 mg) by mouth once daily.      hydroxychloroquine (Plaquenil) 200 mg tablet Take 1 tablet (200 mg) by mouth once daily. 90 tablet 1    ipratropium (Atrovent) 0.02 % nebulizer solution Take 2.5 mL (0.5 mg) by nebulization 4 times a day. 225 mL 3    ipratropium-albuteroL (Duo-Neb) 0.5-2.5 mg/3 mL nebulizer solution Take 3 mL  by nebulization 4 times a day. As needed      multivitamin with iron (DAILY MULTIPLE VITAMINS/IRON ORAL) Take 1 tablet by mouth once daily.      NON FORMULARY Patient with h/o advanced COPD, currently requiring supplemental oxygen,requires home nebulizzer for administration of bronchodilators ( Albuterol solution), for rescue therapy. I attest that this is a medically necessary treatment.      Trelegy Ellipta 200-62.5-25 mcg blister with device Inhale 1 puff once daily. 28 each 3     No current facility-administered medications for this visit.        Active Problems:  Felix Mancera is a 69 y.o. male with the following Problems and Medications.  Patient Active Problem List   Diagnosis    COPD (chronic obstructive pulmonary disease) (Multi)    Rheumatoid arthritis (Multi)    Acute lumbar radiculopathy    Chronic bronchitis (Multi)    Chronic dyspnea    Chronic pain of both shoulders    Combined form of age-related cataract, right eye    HLD (hyperlipidemia)    Combs sign present    Hx of decompressive lumbar laminectomy    Hyperopia with presbyopia    Lumbar pain    Lung nodules    Nicotine dependence    Polyarthritis    Rotator cuff tear    Tinea cruris    Tobacco abuse, in remission    Unexplained weight loss    Cervical spinal stenosis    Elevated PSA     Current Outpatient Medications   Medication Sig Dispense Refill    albuterol 2.5 mg /3 mL (0.083 %) nebulizer solution Take 3 mL (2.5 mg) by nebulization every 4 hours if needed for wheezing or shortness of breath. 225 mL 2    albuterol 90 mcg/actuation inhaler Inhale 2 puffs every 4 hours if needed for wheezing or shortness of breath. 18 g 3    albuterol 90 mcg/actuation inhaler Inhale 2 puffs every 4 hours if needed for wheezing. 25.5 g 2    atorvastatin (Lipitor) 40 mg tablet Take 1 tablet (40 mg) by mouth once daily. 90 tablet 3    budesonide (Pulmicort) 0.5 mg/2 mL nebulizer solution Inhale twice a day.      folic acid (Folvite) 1 mg tablet Take 1  tablet (1 mg) by mouth once daily.      hydroxychloroquine (Plaquenil) 200 mg tablet Take 1 tablet (200 mg) by mouth once daily. 90 tablet 1    ipratropium (Atrovent) 0.02 % nebulizer solution Take 2.5 mL (0.5 mg) by nebulization 4 times a day. 225 mL 3    ipratropium-albuteroL (Duo-Neb) 0.5-2.5 mg/3 mL nebulizer solution Take 3 mL by nebulization 4 times a day. As needed      multivitamin with iron (DAILY MULTIPLE VITAMINS/IRON ORAL) Take 1 tablet by mouth once daily.      NON FORMULARY Patient with h/o advanced COPD, currently requiring supplemental oxygen,requires home nebulizzer for administration of bronchodilators ( Albuterol solution), for rescue therapy. I attest that this is a medically necessary treatment.      Trelegy Ellipta 200-62.5-25 mcg blister with device Inhale 1 puff once daily. 28 each 3     No current facility-administered medications for this visit.       PMH:  Past Medical History:   Diagnosis Date    Other specified health status     No pertinent past medical history       PSH:  Past Surgical History:   Procedure Laterality Date    OTHER SURGICAL HISTORY  01/28/2021    Neck surgery       FMH:  Family History   Problem Relation Name Age of Onset    Other (AMD) Mother      Hypertension Mother         SHx:  Social History     Tobacco Use    Smoking status: Never    Smokeless tobacco: Never       Allergies:  Allergies   Allergen Reactions    Other Unknown       TRUS Guided Biopsy of the prostate     He was counseled on his options and elected to have a transrectal ultrasound-guided prostate biopsy. He understood the risks of the procedure to include but not be limited to bleeding, pain, infection, urosepsis, impotence, difficulties with urination, retention, false negative result and need for further procedures.      Procedure:    1. Transrectal ultrasound of the prostate and seminal vesicles (30000).     2. Ultrasound for needle biopsy (12425).     3. Nerve Block of Prostate (75738).   4.  Prostate biopsy (24307).     PSA, 6/2024: 10.17ng/ml,      Prostate Volume: 33cc     Seminal Vesicles: normal     Findings:   Calcifications: none  Cysts: none  Hypoechogenic areas: none  Representative images were saved     TRUS Biopsy:   Periprostatic block: Yes, 10cc lidocaine   Location of block: prostate/SV junction   Total number of prostate biopsies: 16     Standard template: Yes     Complications: none           Assessment/Plan    The patient will call to make an appointment to discuss the biopsy results in clinic in 1-2 weeks. He knows to call or go immediately to the emergency room should he develop fevers, chills, inability to urinate, significant bleeding or any other concerns.       Scribe Attestation  By signing my name below, I, Charlene Aragon, Scrjuanita, attest that this documentation  has been prepared under the direction and in the presence of Felix Parrish MD.

## 2024-07-30 LAB
LABORATORY COMMENT REPORT: NORMAL
PATH REPORT.FINAL DX SPEC: NORMAL
PATH REPORT.GROSS SPEC: NORMAL
PATH REPORT.RELEVANT HX SPEC: NORMAL
PATH REPORT.TOTAL CANCER: NORMAL

## 2024-08-01 ENCOUNTER — APPOINTMENT (OUTPATIENT)
Dept: PRIMARY CARE | Facility: CLINIC | Age: 69
End: 2024-08-01
Payer: MEDICARE

## 2024-08-01 ENCOUNTER — TELEPHONE (OUTPATIENT)
Dept: PULMONOLOGY | Facility: HOSPITAL | Age: 69
End: 2024-08-01

## 2024-08-01 DIAGNOSIS — J40 BRONCHITIS: Primary | ICD-10-CM

## 2024-08-01 DIAGNOSIS — J44.9 CHRONIC OBSTRUCTIVE PULMONARY DISEASE, UNSPECIFIED COPD TYPE (MULTI): Primary | ICD-10-CM

## 2024-08-01 RX ORDER — PREDNISONE 20 MG/1
40 TABLET ORAL DAILY
Qty: 10 TABLET | Refills: 0 | OUTPATIENT
Start: 2024-08-01 | End: 2024-08-06

## 2024-08-01 RX ORDER — PREDNISONE 20 MG/1
40 TABLET ORAL DAILY
Qty: 10 TABLET | Refills: 0 | Status: SHIPPED | OUTPATIENT
Start: 2024-08-01 | End: 2024-08-06

## 2024-08-01 RX ORDER — AZITHROMYCIN 250 MG/1
TABLET, FILM COATED ORAL
Qty: 6 TABLET | Refills: 0 | OUTPATIENT
Start: 2024-08-01 | End: 2024-08-06

## 2024-08-01 RX ORDER — DOXYCYCLINE HYCLATE 100 MG
100 TABLET ORAL 2 TIMES DAILY
Qty: 20 TABLET | Refills: 0 | Status: SHIPPED | OUTPATIENT
Start: 2024-08-01 | End: 2024-08-11

## 2024-08-01 NOTE — TELEPHONE ENCOUNTER
Sent the following message to Dr. Guevara:  This patient is complaining of a productive cough with cream colored sputum, increase fatigue, SOB at rest and with exertion (he started using his oxygen at night), wheezing, chest tightness, and increase nebulizer use with very little relief. Patient denies fever and chills. He is requesting an antibiotic and prednisone.   Informed the patient that per Dr. Guevara, prescriptions for a Zpack and a Prednisone burst have been sent to his pharmacy on record.  Patient expressed understanding and appreciation for the call.

## 2024-08-04 DIAGNOSIS — J43.9 PULMONARY EMPHYSEMA, UNSPECIFIED EMPHYSEMA TYPE (MULTI): ICD-10-CM

## 2024-08-05 RX ORDER — ALBUTEROL SULFATE 0.83 MG/ML
SOLUTION RESPIRATORY (INHALATION)
Qty: 270 ML | Refills: 2 | Status: SHIPPED | OUTPATIENT
Start: 2024-08-05

## 2024-08-08 ENCOUNTER — APPOINTMENT (OUTPATIENT)
Dept: UROLOGY | Facility: HOSPITAL | Age: 69
End: 2024-08-08
Payer: MEDICARE

## 2024-08-08 DIAGNOSIS — R97.20 ELEVATED PSA: Primary | ICD-10-CM

## 2024-08-08 PROCEDURE — 99213 OFFICE O/P EST LOW 20 MIN: CPT | Performed by: UROLOGY

## 2024-08-08 NOTE — PROGRESS NOTES
HPI    69 y.o. male being seen with the following problem list:    Problem list:  Elevated PSA     MR Prostate 3/5/24:  IMPRESSION:  *No evidence of clinically significant prostatic neoplasm.  *BPH changes of the transition zone. Diffuse non nodular  hypointensities within the peripheral zone, without evidence of  focally restricted diffusion which may reflect sequelae of prior  prostatitis. (PI-RADS 2).  *Prostate volume is 32.5g. PSA density is 0.24g.      3/20/24 - seen today via  to review prostate MRI results. Doing well.     06/19/24 - seen back with PSA prior. No interval changes     07/17/24 - here for TRUS bx. 1g IM CTX received 30 mins prior. R/b/a discussed. Path benign, focal chronic inflammation    08/08/24 - seen back to review path s/p prostate bx. No significant issues after bx.    Lab Results   Component Value Date    PSA 10.17 (H) 06/03/2024    PSA 7.67 (H) 01/26/2024    PSA 6.66 (H) 08/24/2020         Current Medications:  Current Outpatient Medications   Medication Sig Dispense Refill    albuterol 2.5 mg /3 mL (0.083 %) nebulizer solution USE 1 VIAL VIA NEBULIZER EVERY 4 HOURS AS NEEDED FOR WHEEZING / SHORTNESS OF BREATH 270 mL 2    albuterol 90 mcg/actuation inhaler Inhale 2 puffs every 4 hours if needed for wheezing or shortness of breath. 18 g 3    albuterol 90 mcg/actuation inhaler Inhale 2 puffs every 4 hours if needed for wheezing. 25.5 g 2    atorvastatin (Lipitor) 40 mg tablet Take 1 tablet (40 mg) by mouth once daily. 90 tablet 3    budesonide (Pulmicort) 0.5 mg/2 mL nebulizer solution Inhale twice a day.      doxycycline (Vibra-Tabs) 100 mg tablet Take 1 tablet (100 mg) by mouth 2 times a day for 10 days. Take with a full glass of water and do not lie down for at least 30 minutes after. 20 tablet 0    folic acid (Folvite) 1 mg tablet Take 1 tablet (1 mg) by mouth once daily.      hydroxychloroquine (Plaquenil) 200 mg tablet Take 1 tablet (200 mg) by mouth once daily. 90 tablet 1     ipratropium (Atrovent) 0.02 % nebulizer solution Take 2.5 mL (0.5 mg) by nebulization 4 times a day. 225 mL 3    ipratropium-albuteroL (Duo-Neb) 0.5-2.5 mg/3 mL nebulizer solution Take 3 mL by nebulization 4 times a day. As needed      multivitamin with iron (DAILY MULTIPLE VITAMINS/IRON ORAL) Take 1 tablet by mouth once daily.      NON FORMULARY Patient with h/o advanced COPD, currently requiring supplemental oxygen,requires home nebulizzer for administration of bronchodilators ( Albuterol solution), for rescue therapy. I attest that this is a medically necessary treatment.      Trelegy Ellipta 200-62.5-25 mcg blister with device Inhale 1 puff once daily. 28 each 3     No current facility-administered medications for this visit.        Active Problems:  Felix Mancera is a 69 y.o. male with the following Problems and Medications.  Patient Active Problem List   Diagnosis    COPD (chronic obstructive pulmonary disease) (Multi)    Rheumatoid arthritis (Multi)    Acute lumbar radiculopathy    Chronic bronchitis (Multi)    Chronic dyspnea    Chronic pain of both shoulders    Combined form of age-related cataract, right eye    HLD (hyperlipidemia)    Combs sign present    Hx of decompressive lumbar laminectomy    Hyperopia with presbyopia    Lumbar pain    Lung nodules    Nicotine dependence    Polyarthritis    Rotator cuff tear    Tinea cruris    Tobacco abuse, in remission    Unexplained weight loss    Cervical spinal stenosis    Elevated PSA     Current Outpatient Medications   Medication Sig Dispense Refill    albuterol 2.5 mg /3 mL (0.083 %) nebulizer solution USE 1 VIAL VIA NEBULIZER EVERY 4 HOURS AS NEEDED FOR WHEEZING / SHORTNESS OF BREATH 270 mL 2    albuterol 90 mcg/actuation inhaler Inhale 2 puffs every 4 hours if needed for wheezing or shortness of breath. 18 g 3    albuterol 90 mcg/actuation inhaler Inhale 2 puffs every 4 hours if needed for wheezing. 25.5 g 2    atorvastatin (Lipitor) 40 mg tablet  Take 1 tablet (40 mg) by mouth once daily. 90 tablet 3    budesonide (Pulmicort) 0.5 mg/2 mL nebulizer solution Inhale twice a day.      doxycycline (Vibra-Tabs) 100 mg tablet Take 1 tablet (100 mg) by mouth 2 times a day for 10 days. Take with a full glass of water and do not lie down for at least 30 minutes after. 20 tablet 0    folic acid (Folvite) 1 mg tablet Take 1 tablet (1 mg) by mouth once daily.      hydroxychloroquine (Plaquenil) 200 mg tablet Take 1 tablet (200 mg) by mouth once daily. 90 tablet 1    ipratropium (Atrovent) 0.02 % nebulizer solution Take 2.5 mL (0.5 mg) by nebulization 4 times a day. 225 mL 3    ipratropium-albuteroL (Duo-Neb) 0.5-2.5 mg/3 mL nebulizer solution Take 3 mL by nebulization 4 times a day. As needed      multivitamin with iron (DAILY MULTIPLE VITAMINS/IRON ORAL) Take 1 tablet by mouth once daily.      NON FORMULARY Patient with h/o advanced COPD, currently requiring supplemental oxygen,requires home nebulizzer for administration of bronchodilators ( Albuterol solution), for rescue therapy. I attest that this is a medically necessary treatment.      Trelegy Ellipta 200-62.5-25 mcg blister with device Inhale 1 puff once daily. 28 each 3     No current facility-administered medications for this visit.       PMH:  Past Medical History:   Diagnosis Date    Other specified health status     No pertinent past medical history       PSH:  Past Surgical History:   Procedure Laterality Date    OTHER SURGICAL HISTORY  01/28/2021    Neck surgery       FMH:  Family History   Problem Relation Name Age of Onset    Other (AMD) Mother      Hypertension Mother         SHx:  Social History     Tobacco Use    Smoking status: Never    Smokeless tobacco: Never       Allergies:  Allergies   Allergen Reactions    Other Unknown       Assessment/Plan  69 years old, chronically elevated PSA, but neg MRI, neg bx. Would not recommend further PSA screening. No significant bothersome LUTS.    Will see me  tank        Scribe Attestation  By signing my name below, I, Charlene Aragon, Scribe, attest that this documentation  has been prepared under the direction and in the presence of Felix Parrish MD.

## 2024-08-20 DIAGNOSIS — J43.9 PULMONARY EMPHYSEMA, UNSPECIFIED EMPHYSEMA TYPE (MULTI): ICD-10-CM

## 2024-08-20 RX ORDER — ALBUTEROL SULFATE 0.83 MG/ML
2.5 SOLUTION RESPIRATORY (INHALATION) EVERY 4 HOURS PRN
Qty: 270 ML | Refills: 3 | Status: SHIPPED | OUTPATIENT
Start: 2024-08-20 | End: 2024-08-21 | Stop reason: SDUPTHER

## 2024-08-21 DIAGNOSIS — J43.9 PULMONARY EMPHYSEMA, UNSPECIFIED EMPHYSEMA TYPE (MULTI): ICD-10-CM

## 2024-08-21 RX ORDER — ALBUTEROL SULFATE 0.83 MG/ML
2.5 SOLUTION RESPIRATORY (INHALATION) EVERY 4 HOURS PRN
Qty: 270 ML | Refills: 3 | Status: SHIPPED | OUTPATIENT
Start: 2024-08-21

## 2024-09-01 DIAGNOSIS — J44.1 COPD EXACERBATION (MULTI): ICD-10-CM

## 2024-09-03 RX ORDER — IPRATROPIUM BROMIDE 0.5 MG/2.5ML
0.5 SOLUTION RESPIRATORY (INHALATION) 4 TIMES DAILY
Qty: 225 ML | Refills: 3 | Status: SHIPPED | OUTPATIENT
Start: 2024-09-03

## 2024-09-17 ENCOUNTER — HOSPITAL ENCOUNTER (OUTPATIENT)
Dept: RADIOLOGY | Facility: HOSPITAL | Age: 69
Discharge: HOME | End: 2024-09-17
Payer: MEDICARE

## 2024-09-17 DIAGNOSIS — Z87.891 HISTORY OF NICOTINE USE: ICD-10-CM

## 2024-09-17 PROCEDURE — 71271 CT THORAX LUNG CANCER SCR C-: CPT

## 2024-09-17 PROCEDURE — 71271 CT THORAX LUNG CANCER SCR C-: CPT | Performed by: RADIOLOGY

## 2024-09-18 ENCOUNTER — LAB (OUTPATIENT)
Dept: LAB | Facility: LAB | Age: 69
End: 2024-09-18
Payer: MEDICARE

## 2024-09-18 ENCOUNTER — OFFICE VISIT (OUTPATIENT)
Dept: PULMONOLOGY | Facility: CLINIC | Age: 69
End: 2024-09-18
Payer: MEDICARE

## 2024-09-18 ENCOUNTER — TELEPHONE (OUTPATIENT)
Dept: RESPIRATORY THERAPY | Facility: CLINIC | Age: 69
End: 2024-09-18

## 2024-09-18 VITALS
WEIGHT: 169.8 LBS | DIASTOLIC BLOOD PRESSURE: 77 MMHG | SYSTOLIC BLOOD PRESSURE: 105 MMHG | BODY MASS INDEX: 25.73 KG/M2 | HEIGHT: 68 IN | OXYGEN SATURATION: 96 % | TEMPERATURE: 98.1 F | HEART RATE: 104 BPM

## 2024-09-18 DIAGNOSIS — J42 CHRONIC BRONCHITIS, UNSPECIFIED CHRONIC BRONCHITIS TYPE (MULTI): ICD-10-CM

## 2024-09-18 DIAGNOSIS — G47.33 OSA (OBSTRUCTIVE SLEEP APNEA): ICD-10-CM

## 2024-09-18 DIAGNOSIS — J42 CHRONIC BRONCHITIS, UNSPECIFIED CHRONIC BRONCHITIS TYPE (MULTI): Primary | ICD-10-CM

## 2024-09-18 DIAGNOSIS — J44.9 CHRONIC OBSTRUCTIVE PULMONARY DISEASE, UNSPECIFIED COPD TYPE (MULTI): ICD-10-CM

## 2024-09-18 DIAGNOSIS — R06.09 DYSPNEA ON EXERTION: ICD-10-CM

## 2024-09-18 LAB
IGA SERPL-MCNC: 224 MG/DL (ref 70–400)
IGG SERPL-MCNC: 1430 MG/DL (ref 700–1600)
IGG SERPL-MCNC: 1430 MG/DL (ref 700–1600)
IGG1 SER-MCNC: 1230 MG/DL (ref 490–1140)
IGG2 SER-MCNC: 217 MG/DL (ref 150–640)
IGG3 SER-MCNC: 47 MG/DL (ref 11–85)
IGG4 SER-MCNC: 69 MG/DL (ref 3–200)
IGM SERPL-MCNC: 123 MG/DL (ref 40–230)

## 2024-09-18 PROCEDURE — 82784 ASSAY IGA/IGD/IGG/IGM EACH: CPT

## 2024-09-18 PROCEDURE — 36415 COLL VENOUS BLD VENIPUNCTURE: CPT

## 2024-09-18 PROCEDURE — 1159F MED LIST DOCD IN RCRD: CPT | Performed by: STUDENT IN AN ORGANIZED HEALTH CARE EDUCATION/TRAINING PROGRAM

## 2024-09-18 PROCEDURE — 3008F BODY MASS INDEX DOCD: CPT | Performed by: STUDENT IN AN ORGANIZED HEALTH CARE EDUCATION/TRAINING PROGRAM

## 2024-09-18 PROCEDURE — 99214 OFFICE O/P EST MOD 30 MIN: CPT | Performed by: STUDENT IN AN ORGANIZED HEALTH CARE EDUCATION/TRAINING PROGRAM

## 2024-09-18 RX ORDER — BUDESONIDE 0.5 MG/2ML
0.5 INHALANT ORAL
Qty: 60 ML | Refills: 11 | Status: SHIPPED | OUTPATIENT
Start: 2024-09-18

## 2024-09-18 ASSESSMENT — ENCOUNTER SYMPTOMS
ENDOCRINE NEGATIVE: 1
SHORTNESS OF BREATH: 0
CONSTITUTIONAL NEGATIVE: 1
EYES NEGATIVE: 1
GASTROINTESTINAL NEGATIVE: 1
COUGH: 0
CARDIOVASCULAR NEGATIVE: 1

## 2024-09-18 ASSESSMENT — COPD QUESTIONNAIRES
QUESTION3_CHESTTIGHTNESS: 1
CAT_TOTALSCORE: 17
QUESTION6_LEAVINGHOUSE: 3
QUESTION1_COUGHFREQUENCY: 3
QUESTION8_ENERGYLEVEL: 3
QUESTION2_CHESTPHLEGM: 3
QUESTION7_SLEEPQUALITY: 0 - I SLEEP SOUNDLY
QUESTION5_HOMEACTIVITIES: 0 - I AM NOT LIMITED DOING ANY ACTIVITIES AT HOME
QUESTION4_WALKINCLINE: 4

## 2024-09-18 NOTE — PATIENT INSTRUCTIONS
Thank you for visiting the [facility] today.   Your breathing medications: continue trelegy, continue albuterol as needed   Tests: blood work to check antibody levels, echo to check the heart, abg (arterial blood gas)and sleep study   Will send referral for lung volume reduction to see if you are a candidate   Return in 3 months   If you have questions or concerns, call [phone] (option 4)

## 2024-09-18 NOTE — PROGRESS NOTES
Department of Medicine I Division of Pulmonary, Critical Care, and Sleep Medicine   [id number] [place] [address] [address] [place] [address]  Phone: [phone]  Fax: [phone]    History of Present Illness   Felix Mancera is a 69 y.o. male presenting for follow up of COPD.    9/18/2024   Since the last visit   Did go to urgent care since last time--got abx and steroids     Having more exertional symptoms  Denies any orthopnea per say but does sleep propped up due to pain issues. Denies pnd.  Denies any swelling in the legs   Did gain 20lbs     Pulmonary medications:  trelegy, albuterol       4/18/2024 (virtual visit--phone call)   Been doing well since the last visit   Went to [place] and had symptoms when he came back --went to urgent care got prednisone and azithromycin   Did do pulmonary rehab   Family is planning to move back to [place]   A1AT level is normal       10/2023  Pulmonology Questionnaire (Submitted on 10/11/2023)  Chief Complaint: Primary symptoms  Do you experience frequent throat clearing?: Yes  Chronicity: chronic  When did you first notice your symptoms?: more than 1 year ago  How often do your symptoms occur?: intermittently  Since you first noticed this problem, how has it changed?: gradually improving  Which of the following makes your symptoms worse?: climbing stairs, strenuous activity, URI  Which of the following makes your symptoms better?: ipratropium    Has issues when he goes to his son's house in [place], [place]   Started pulmonary rehab a few months ago  Triggers: humid air,   Pulmonary medications: trelegy, duoneb (once a week)    Last visit June 2023     Since last visit on 5/4/23 with Dr. Jung, patient unfortunately had a repeat hospital admission for COPD exacerbation from 6/19 through 6/24/2023. He had presented with significant shortness of breath for 3 days and required BiPAP in the ED; briefly on Precedex and admitted to the MICU. He was transitioned to  supplemental O2 and improved with his ABGs. There was low concern for bacterial pneumonia. Was eventually transitioned down to 2 L nasal cannula and transferred to the floor. Was continued on a steroid course extended to 7 days. Was referred to pulmonary rehab (Per chart review, patient had refused pulmonary rehab previously). Was previously advised Roflumilast as part of a study (but was told it wouldn't’t be paid for and he didn't want to start paying for it himself). He never ended up starting on it. States he will be starting up pulmonary rehab soon. He has oxygen at home; which was originally given to him for some mild nocturnal hypoxia. Tried it for a couple months; stopped using it. He checks his SpO2 at home periodically at rest as well as after exertion. Can drop as low as 88-90%. Not putting oxygen on during the day. Using Trelegy 100 daily. Was hardly using his albuterol prior to going into the hospital. After coming home after this recent exacerbation, has only needed it about 1-2x. He has a chronic cough - worse in the AM. Mildly productive with white/clear sputum. Admits to fairly frequent wheezing. RAY is fairly significant - he can feel winded moving around his house. Denies SOB at rest. Has intermittent runny nose - takes OTC Mucinex (I advised switching to Cetirizine). Denies GERD. Denies Chest pain, palpitations, lower leg edema.  CAT Today: 17        Last visit 1/2023: started trelegy       #COPD  No alpha 1 antitrypsin testing done since his last visit, reminded patient today.   Mr. Mancera has started using trelegy and has no complaints with it. He has started working back at the [email or web address] with some work modifications and is very happy to be back working. He has only had to stop occasionally and otherwise modifies his work for his shoulder pain.   He had a cold 1 month ago but did not require any steroids or physician evaluation. He did use his albuterol a little during that time.   He does  have a cough in the AM and seldom wheezes. He cites living in an old [place] house as part of the triggers for his symptoms.   He is not interested in pulmonary rehab.     #Nocturnal hypoxia  He was prescribed nocturnal oxygen but hates it and stopped using i. He reportst that he sleeps well, with no snoring, or daytime fatigue.   He is not interested in a sleep medicine referral.       Past Medical History: COPD, RA, HLD, tobacco use  Surgical History: C spine  Social History: former smoker 35 pack years, quit 2012, [place],   Family History: father - COPD, emphysema, grandfather - emphysema    6MWT 326 m, 63%  PFT 1/5/23: FVC 91%, FEV 34%, ratio 28, %, gas trapping present, DLCO 50  Review of Systems  Review of Systems   Constitutional: Negative.    HENT: Negative.     Eyes: Negative.    Respiratory:  Negative for cough and shortness of breath.         As per hpi   Cardiovascular: Negative.    Gastrointestinal: Negative.    Endocrine: Negative.    Genitourinary: Negative.      All other review of systems are negative and/or non-contributory.    Past Medical History   He has a past medical history of Other specified health status.    Immunizations     Immunization History   Administered Date(s) Administered    Flu vaccine, quadrivalent, high-dose, preservative free, age 65y+ (FLUZONE) 10/13/2022    Influenza, Seasonal, Quadrivalent, Adjuvanted 10/10/2023    Moderna COVID-19 vaccine, bivalent, blue cap/gray label *Check age/dose* 10/17/2022    Moderna SARS-CoV-2 Vaccination 03/06/2021, 04/20/2022    Pfizer COVID-19 vaccine, 12 years and older, (30mcg/0.3mL) (Comirnaty) 10/10/2023    Pfizer Purple Cap SARS-CoV-2 10/30/2021    Pneumococcal conjugate vaccine, 20-valent (PREVNAR 20) 06/08/2022    Pneumococcal polysaccharide vaccine, 23-valent, age 2 years and older (PNEUMOVAX 23) 08/03/2020, 01/05/2023    Tdap vaccine, age 7 year and older (BOOSTRIX, ADACEL) 05/28/2011, 01/26/2022       Medications  "and Allergies     Current Outpatient Medications   Medication Instructions    albuterol 90 mcg/actuation inhaler 2 puffs, inhalation, Every 4 hours PRN    albuterol 90 mcg/actuation inhaler 2 puffs, inhalation, Every 4 hours PRN    albuterol 2.5 mg, nebulization, Every 4 hours PRN    atorvastatin (LIPITOR) 40 mg, oral, Daily    budesonide (Pulmicort) 0.5 mg/2 mL nebulizer solution inhalation, 2 times daily    folic acid (Folvite) 1 mg tablet 1 tablet, oral, Daily    hydroxychloroquine (PLAQUENIL) 200 mg, oral, Daily    ipratropium (ATROVENT) 0.5 mg, nebulization, 4 times daily    ipratropium-albuteroL (Duo-Neb) 0.5-2.5 mg/3 mL nebulizer solution 3 mL, nebulization, 4 times daily RT, As needed    multivitamin with iron (DAILY MULTIPLE VITAMINS/IRON ORAL) 1 tablet, oral, Daily    NON FORMULARY Patient with h/o advanced COPD, currently requiring supplemental oxygen,requires home nebulizzer for administration of bronchodilators ( Albuterol solution), for rescue therapy. I attest that this is a medically necessary treatment.    Trelegy Ellipta 200-62.5-25 mcg blister with device 1 puff, inhalation, Daily RT      Other    Social History   He reports that he has never smoked. He has never used smokeless tobacco. No history on file for alcohol use and drug use.    Smoking History: see HPI   Exposure/Job History: see HPI     Family History     Family History   Problem Relation Name Age of Onset    Other (AMD) Mother      Hypertension Mother             Surgical History   He has a past surgical history that includes Other surgical history (01/28/2021).  Cervical spine surgery    Physical Exam   Ht 1.727 m (5' 8\")   Wt 77 kg (169 lb 12.8 oz)   BMI 25.82 kg/m²      Alert and oriented nad   HEENT: MMM  Chest: lungs clear bl   CV: rrr no murmur   Ext no pedal edema      Results   Pulmonary Function Tests:      No results found for: \"FEV1\", \"KLN8YNXC\"    Chest Radiograph:  XR chest 1 view 06/20/2023    Narrative  Interpreted By: "  LIYAH PINO MD and GILDARDO WAY DO  MRN: [id number]  Patient Name: KIMBER TURK    STUDY:  CHEST 1 VIEW;  6/20/2023 11:05 am    INDICATION:  dyspnea, oxygen requirement .    COMPARISON:  Chest radiograph 06/19/2023 CT chest 08/12/2022.    ACCESSION NUMBER(S):  [id number]    ORDERING CLINICIAN:  CHAGO JAMESON    FINDINGS:  AP radiograph of the chest was provided.    CARDIOMEDIASTINAL SILHOUETTE:  Cardiomediastinal silhouette is normal in size and configuration.    LUNGS:  Redemonstration of hyperinflated lungs with diffuse coarsened  interstitial markings, similar to prior. Bibasilar streaky opacities  favored to represent atelectasis. No new focal consolidation, pleural  effusion, pneumothorax.    ABDOMEN:  No remarkable upper abdominal findings.    BONES:  Remote rib fractures are again seen. Otherwise, no acute osseous  pathology.    Impression  Mild bibasilar atelectasis superimposed on chronic lung changes.  Otherwise, no new focal consolidation, pleural effusion, or  pneumothorax.    I personally reviewed the images/study and I agree with the findings  as stated above by resident physician, Dr. Daniel Schreiber. The  study was interpreted at [facility] in [facility].      Chest CT Scan:  CT lung screening low dose 09/01/2023    Narrative  Interpreted By:  ENRICO GROSSMAN MD  MRN: [id number]  Patient Name: KIMBER TURK    STUDY:  TH CT CHEST LOW DOSE FOR LUNG SCREENING WO CONTRAST;  9/1/2023 3:23 pm    INDICATION:  cough, dyspnea on exertion  R91.8: Lung nodules.    COMPARISON:  08/12/2022.    ACCESSION NUMBER(S):  [id number]    ORDERING CLINICIAN:  SARITHA VALDES    TECHNIQUE:  Helical data acquisition of the chest was obtained without IV  contrast material.  Images were reformatted in axial, coronal, and  sagittal planes.    FINDINGS:  LUNGS AND AIRWAYS:  The trachea and central airways are patent. No endobronchial lesion  is seen.    There is severe  emphysematous changes. There is diffuse peribronchial  thickening.There is no focal consolidation, pleural effusion, or  pneumothorax.    There is a 3 mm right lower lobe parenchymal lung nodule unchanged.  There are no suspicious parenchymal lung nodules.    MEDIASTINUM AND ZACH, LOWER NECK AND AXILLA:  The visualized thyroid gland is within normal limits.  Scattered mediastinal lymph nodes are felt to be reactive in nature  Esophagus appears within normal limits as seen.    HEART AND VESSELS:  There is mild atherosclerotic changes of the thoracic aorta.  Main pulmonary artery and its branches are normal in caliber.  There is mild coronary artery calcifications.  The cardiac chambers are not enlarged.  There is no pericardial effusion seen.    UPPER ABDOMEN:  The visualized subdiaphragmatic structures demonstrate no remarkable  findings.        CHEST WALL AND OSSEOUS STRUCTURES:  Chest wall is within normal limits.  No acute osseous pathology.There are no suspicious osseous lesions.    Impression  1. There are no suspicious parenchymal lung nodules. Recommend  continued 1 year low-dose chest CT for surveillance.        LUNG RADS CATEGORY:  Lung-RADS 2 (Benign Appearance or Indolent behavior): Continue with  annual screening in 12 months,  CT Chest Lung Ca Screen Initial  or Follow up LR1/LR2/Continuation of Screening Low Dose recommended  as per American College of Radiology Guidelines Lung-RADS Version  2022.       CT Chest 9/18/2024   IMPRESSION:  1. Few small bilateral noncalcified pulmonary nodules measuring up  to 4 mm, as described above. Continued screening with low-dose  noncontrast chest CT in 12 months (from current date) is recommended.  2. Moderate to severe diffuse emphysema.  3. Estimated coronary artery calcium score is 57* which correlates  with at least 35th percentile rank as compared to matched SALGADO-study  subjects(https://www.salgado-nhlbi.org/Calcium/input.aspx).    LUNG RADS CATEGORY:  Lung  Rad: Lung-RADS 2 (Benign Appearance or Indolent Behavior)    ECHO:  Echocardiogram     Narrative  [facility], [facility] [address]  Tel [phone] and Fax [phone]    TRANSTHORACIC ECHOCARDIOGRAM REPORT      Patient Name:     KIMBER Godoy Physician:  [id number] CONTIANDERSON  Study Date:       12/12/2022          Referring           SARITHA VALDES  Physician:  MRN/PID:          [id number]            PCP:                Terrence Farley MD  Accession/Order#: [id number]        Department          [facility] Non  Location:           Invasive  YOB: 1955            Fellow:  Gender:           M                   Nurse:  Admit Date:                           Sonographer:        Graciela Willson [facility]  Admission Status: Outpatient          Additional Staff:  Height:           172.72 cm           CC Report to:  Weight:           72.57 kg            Study Type:         Echocardiogram  BSA:              1.86 m2  Blood Pressure: 115 /77 mmHg    Diagnosis/ICD: R06.00-Dyspnea, unspecified  Indication:    Chronic dyspnea; COPD; RAY  Procedure/CPT: Echo Complete w Full [id number]    Patient History:  Smoker:            Former.  Pertinent History: Dyspnea and COPD.    Study Detail: The following Echo studies were performed: 2D, M-Mode, Doppler and  color flow. Technically challenging study due to prominent lung  artifact.      PHYSICIAN INTERPRETATION:  Left Ventricle: The left ventricular systolic function is normal. There are no regional wall motion abnormalities. The left ventricular cavity size is normal. Left ventricular diastolic filling was indeterminate.  Left Atrium: The left atrium was not well visualized.  Right Ventricle: The right ventricle is normal in size. There is normal right ventricular global systolic function.  Right Atrium: The right atrium is normal in size.  Aortic Valve: The aortic valve was not well visualized. There is indeterminate  aortic valve regurgitation. The peak instantaneous gradient of the aortic valve is 5.3 mmHg.  Mitral Valve: The mitral valve is normal in structure. There is trace mitral valve regurgitation.  Tricuspid Valve: The tricuspid valve was not well visualized. There is trace tricuspid regurgitation. The right ventricular systolic pressure is unable to be estimated.  Pulmonic Valve: The pulmonic valve is structurally normal. There is no indication of pulmonic valve regurgitation.  Pericardium: There is no pericardial effusion noted.  Aorta: The aortic root was not well visualized.  Systemic Veins: The inferior vena cava size appears small.  In comparison to the previous echocardiogram(s): There are no prior studies on this patient for comparison purposes.      CONCLUSIONS:  1. Left ventricular systolic function is normal.  2. Poorly visualized anatomical structures due to suboptimal image quality.    QUANTITATIVE DATA SUMMARY:  LA VOLUME:  Normal Ranges:  LA Vol A4C:        17.2 ml    (22+/-6mL/m2)  LA Vol A2C:        21.1 ml  LA Vol BP:         19.5 ml  LA Vol Index A4C:  9.3 ml/m2  LA Vol Index A2C:  11.3 ml/m2  LA Vol Index BP:   10.5 ml/m2  LA Area A4C:       9.0 cm2  LA Area A2C:       10.2 cm2  LA Major Axis A4C: 4.0 cm  LA Major Axis A2C: 4.2 cm  LA Volume Index:   10.5 ml/m2  LA Vol A4C:        15.3 ml  LA Vol A2C:        20.3 ml    M-MODE MEASUREMENTS:  Normal Ranges:  Ao Root: 3.10 cm (2.0-3.7cm)    LV SYSTOLIC FUNCTION BY 2D PLANIMETRY (MOD):  Normal Ranges:  EF-A4C View: 58.0 % (>=55%)  EF-A2C View: 62.6 %  EF-Biplane:  60.0 %    LV DIASTOLIC FUNCTION:  Normal Ranges:  MV Peak E:        0.78 m/s    (0.7-1.2 m/s)  MV Peak A:        0.72 m/s    (0.42-0.7 m/s)  E/A Ratio:        1.08        (1.0-2.2)  MV lateral e'     0.07 m/s  MV medial e'      0.06 m/s  MV A Dur:         106.11 msec  PulmV Sys Ze:    53.95 cm/s  PulmV Mckeon Ze:   37.44 cm/s  PulmV S/D Ze:    1.44  PulmV A Revs Ze: 30.86 cm/s  PulmV A Revs  "Dur: 85.35 msec    MITRAL VALVE:  Normal Ranges:  MV Vmax:    0.91 m/s (<=1.3m/s)  MV peak PG: 3.3 mmHg (<5mmHg)  MV mean P.5 mmHg (<2mmHg)  MV VTI:     18.81 cm (10-13cm)  MV DT:      215 msec (150-240msec)    AORTIC VALVE:  Normal Ranges:  AoV Vmax:      1.15 m/s (<=1.7m/s)  AoV Peak P.3 mmHg (<20mmHg)  LVOT Max Ze:  0.93 m/s (<=1.1m/s)  LVOT VTI:      17.31 cm  LVOT Diameter: 1.96 cm  (1.8-2.4cm)  AoV Area,Vmax: 2.44 cm2 (2.5-4.5cm2)    RIGHT VENTRICLE:  RV 1   2.7 cm  RV 2   2.2 cm  RV 3   6.0 cm  TAPSE: 21.0 mm    TRICUSPID VALVE/RVSP:  Normal Ranges:  Est. RA Pressure: 3 mmHg    PULMONIC VALVE:  Normal Ranges:  PV Accel Time: 104 msec (>120ms)  PV Max Ze:    1.0 m/s  (0.6-0.9m/s)  PV Max P.0 mmHg    Pulmonary Veins:  PulmV A Revs Dur: 85.35 msec  PulmV A Revs Ze: 30.86 cm/s  PulmV Mckeon Ze:   37.44 cm/s  PulmV S/D Ez:    1.44  PulmV Sys Ze:    53.95 cm/s      [id number] Bienvenido Valero DO  Electronically signed on 2022 at 5:25:10 PM        Final        Labs:  Lab Results   Component Value Date    WBC 8.7 2023    HGB 15.4 2023    HCT 45.4 2023    MCV 90 2023     2023       Lab Results   Component Value Date    CREATININE 1.04 2024    BUN 18 2024     2024    K 4.1 2024     2024    CO2 28 2024        Lab Results   Component Value Date    SHAHNAZ NEGATIVE 10/01/2019    RF 11 10/01/2018    SEDRATE 7 2019        IgE: No results found for: \"IGE\"   Respiratory Allergy Panel:  AEC:   Eosinophils Absolute (x10*3/uL)   Date Value   2023 0.28     Eosinophils % (%)   Date Value   2023 3.2       Cultures:  No results found for: \"AFBCX\"   No results found for: \"RESPCULTCYFI\"    No results found for the last 90 days.  C     Assessment and Plan        1. Severe COPD: GOLD 3E COPD--appears to have some environmental trigger--may be related to an exposures at his son's house in [place] or a virus he may have " picked up while traveling/from [place].  Reports worsening symptoms difficulty working at [employer]   - continue Trelegy 200 daily  - continue duoneb prn   - Continue albuterol HFA PRN  - some temporality with staying in [place] and his symptoms (family was also having symptoms)--they since moved out of the house.  He is stil having dyspnea symptoms   -suggested LVRS referral given hyperinflation on pft and emphysema   -check immunoglobulins   -echo to check for chf   -abg to check for hypercapnia and whether NIPPV at night may be useful   -Since his family is moving out of the house in [place], will see if he has any further exacerbations           Lung Nodules  -most recent scan 9/2023 with stable 3mm lung nodules  -recommend annual LDCT chest for lung cancer screening--next due in September 2024 --ordered            Follow-up:  3 months or sooner           Xiomara Guevara MD  09/18/2024

## 2024-09-18 NOTE — TELEPHONE ENCOUNTER
Spoke with patient and provided numbers to get blood gas and Echo scheduled. Patient verbalizes understanding to call and get testing scheduled.

## 2024-10-02 ENCOUNTER — APPOINTMENT (OUTPATIENT)
Dept: CARDIOLOGY | Facility: HOSPITAL | Age: 69
End: 2024-10-02
Payer: MEDICARE

## 2024-10-09 DIAGNOSIS — Z00.00 HEALTHCARE MAINTENANCE: ICD-10-CM

## 2024-10-09 RX ORDER — FLUTICASONE FUROATE, UMECLIDINIUM BROMIDE AND VILANTEROL TRIFENATATE 200; 62.5; 25 UG/1; UG/1; UG/1
1 POWDER RESPIRATORY (INHALATION)
Qty: 60 EACH | Refills: 3 | Status: SHIPPED | OUTPATIENT
Start: 2024-10-09

## 2024-10-11 ENCOUNTER — HOSPITAL ENCOUNTER (OUTPATIENT)
Dept: RESPIRATORY THERAPY | Facility: HOSPITAL | Age: 69
Discharge: HOME | End: 2024-10-11
Payer: MEDICARE

## 2024-10-11 ENCOUNTER — HOSPITAL ENCOUNTER (OUTPATIENT)
Dept: CARDIOLOGY | Facility: HOSPITAL | Age: 69
Discharge: HOME | End: 2024-10-11
Payer: MEDICARE

## 2024-10-11 VITALS — WEIGHT: 169 LBS | BODY MASS INDEX: 25.61 KG/M2 | HEIGHT: 68 IN

## 2024-10-11 DIAGNOSIS — J44.9 CHRONIC OBSTRUCTIVE PULMONARY DISEASE, UNSPECIFIED: ICD-10-CM

## 2024-10-11 DIAGNOSIS — R06.09 DYSPNEA ON EXERTION: ICD-10-CM

## 2024-10-11 LAB
AORTIC VALVE MEAN GRADIENT: 2.3 MMHG
AORTIC VALVE PEAK VELOCITY: 1.03 M/S
AV PEAK GRADIENT: 4.2 MMHG
AVA (PEAK VEL): 2.88 CM2
AVA (VTI): 2.74 CM2
BASE EXCESS BLDA CALC-SCNC: 1.7 MMOL/L (ref -2–3)
BODY TEMPERATURE: 37 DEGREES CELSIUS
EJECTION FRACTION APICAL 4 CHAMBER: 61.1
EJECTION FRACTION: 70 %
HCO3 BLDA-SCNC: 26.6 MMOL/L (ref 22–26)
INHALED O2 CONCENTRATION: 21 %
LEFT ATRIUM VOLUME AREA LENGTH INDEX BSA: 12.8 ML/M2
LEFT VENTRICULAR OUTFLOW TRACT DIAMETER: 2.23 CM
MITRAL VALVE E/A RATIO: 0.82
OXYHGB MFR BLDA: 94.4 % (ref 94–98)
PCO2 BLDA: 42 MM HG (ref 38–42)
PH BLDA: 7.41 PH (ref 7.38–7.42)
PO2 BLDA: 85 MM HG (ref 85–95)
RIGHT VENTRICLE FREE WALL PEAK S': 11 CM/S
SAO2 % BLDA: 97 % (ref 94–100)
TRICUSPID ANNULAR PLANE SYSTOLIC EXCURSION: 2 CM

## 2024-10-11 PROCEDURE — 36600 WITHDRAWAL OF ARTERIAL BLOOD: CPT

## 2024-10-11 PROCEDURE — 82805 BLOOD GASES W/O2 SATURATION: CPT

## 2024-10-11 PROCEDURE — 93306 TTE W/DOPPLER COMPLETE: CPT | Performed by: INTERNAL MEDICINE

## 2024-10-11 PROCEDURE — 93306 TTE W/DOPPLER COMPLETE: CPT

## 2024-12-03 ENCOUNTER — APPOINTMENT (OUTPATIENT)
Dept: PRIMARY CARE | Facility: CLINIC | Age: 69
End: 2024-12-03
Payer: MEDICARE

## 2024-12-03 VITALS
HEIGHT: 68 IN | BODY MASS INDEX: 25.76 KG/M2 | TEMPERATURE: 97.3 F | HEART RATE: 88 BPM | WEIGHT: 170 LBS | DIASTOLIC BLOOD PRESSURE: 77 MMHG | OXYGEN SATURATION: 92 % | SYSTOLIC BLOOD PRESSURE: 144 MMHG

## 2024-12-03 DIAGNOSIS — J41.8 MIXED SIMPLE AND MUCOPURULENT CHRONIC BRONCHITIS (MULTI): ICD-10-CM

## 2024-12-03 DIAGNOSIS — M06.041 RHEUMATOID ARTHRITIS INVOLVING BOTH HANDS WITH NEGATIVE RHEUMATOID FACTOR (MULTI): ICD-10-CM

## 2024-12-03 DIAGNOSIS — M13.0 POLYARTHRITIS: Chronic | ICD-10-CM

## 2024-12-03 DIAGNOSIS — Z00.00 MEDICARE ANNUAL WELLNESS VISIT, SUBSEQUENT: ICD-10-CM

## 2024-12-03 DIAGNOSIS — M06.042 RHEUMATOID ARTHRITIS INVOLVING BOTH HANDS WITH NEGATIVE RHEUMATOID FACTOR (MULTI): ICD-10-CM

## 2024-12-03 DIAGNOSIS — Z00.00 HEALTHCARE MAINTENANCE: Primary | ICD-10-CM

## 2024-12-03 DIAGNOSIS — E78.5 HYPERLIPIDEMIA, UNSPECIFIED HYPERLIPIDEMIA TYPE: ICD-10-CM

## 2024-12-03 PROCEDURE — 1126F AMNT PAIN NOTED NONE PRSNT: CPT | Performed by: STUDENT IN AN ORGANIZED HEALTH CARE EDUCATION/TRAINING PROGRAM

## 2024-12-03 PROCEDURE — 1158F ADVNC CARE PLAN TLK DOCD: CPT | Performed by: STUDENT IN AN ORGANIZED HEALTH CARE EDUCATION/TRAINING PROGRAM

## 2024-12-03 PROCEDURE — G0439 PPPS, SUBSEQ VISIT: HCPCS | Performed by: STUDENT IN AN ORGANIZED HEALTH CARE EDUCATION/TRAINING PROGRAM

## 2024-12-03 PROCEDURE — 3008F BODY MASS INDEX DOCD: CPT | Performed by: STUDENT IN AN ORGANIZED HEALTH CARE EDUCATION/TRAINING PROGRAM

## 2024-12-03 PROCEDURE — 1036F TOBACCO NON-USER: CPT | Performed by: STUDENT IN AN ORGANIZED HEALTH CARE EDUCATION/TRAINING PROGRAM

## 2024-12-03 PROCEDURE — 99397 PER PM REEVAL EST PAT 65+ YR: CPT | Performed by: STUDENT IN AN ORGANIZED HEALTH CARE EDUCATION/TRAINING PROGRAM

## 2024-12-03 PROCEDURE — 1123F ACP DISCUSS/DSCN MKR DOCD: CPT | Performed by: STUDENT IN AN ORGANIZED HEALTH CARE EDUCATION/TRAINING PROGRAM

## 2024-12-03 PROCEDURE — 1159F MED LIST DOCD IN RCRD: CPT | Performed by: STUDENT IN AN ORGANIZED HEALTH CARE EDUCATION/TRAINING PROGRAM

## 2024-12-03 PROCEDURE — 1170F FXNL STATUS ASSESSED: CPT | Performed by: STUDENT IN AN ORGANIZED HEALTH CARE EDUCATION/TRAINING PROGRAM

## 2024-12-03 PROCEDURE — 1160F RVW MEDS BY RX/DR IN RCRD: CPT | Performed by: STUDENT IN AN ORGANIZED HEALTH CARE EDUCATION/TRAINING PROGRAM

## 2024-12-03 RX ORDER — HYDROXYCHLOROQUINE SULFATE 200 MG/1
200 TABLET, FILM COATED ORAL DAILY
Qty: 90 TABLET | Refills: 3 | Status: SHIPPED | OUTPATIENT
Start: 2024-12-03

## 2024-12-03 RX ORDER — ATORVASTATIN CALCIUM 40 MG/1
40 TABLET, FILM COATED ORAL DAILY
Qty: 90 TABLET | Refills: 3 | Status: SHIPPED | OUTPATIENT
Start: 2024-12-03 | End: 2025-12-03

## 2024-12-03 ASSESSMENT — PATIENT HEALTH QUESTIONNAIRE - PHQ9
2. FEELING DOWN, DEPRESSED OR HOPELESS: NOT AT ALL
SUM OF ALL RESPONSES TO PHQ9 QUESTIONS 1 AND 2: 0
1. LITTLE INTEREST OR PLEASURE IN DOING THINGS: NOT AT ALL

## 2024-12-03 ASSESSMENT — ACTIVITIES OF DAILY LIVING (ADL)
MANAGING_FINANCES: INDEPENDENT
BATHING: INDEPENDENT
GROCERY_SHOPPING: INDEPENDENT
TAKING_MEDICATION: INDEPENDENT
DRESSING: INDEPENDENT
DOING_HOUSEWORK: INDEPENDENT

## 2024-12-03 ASSESSMENT — PAIN SCALES - GENERAL: PAINLEVEL_OUTOF10: 0-NO PAIN

## 2024-12-03 NOTE — PATIENT INSTRUCTIONS
Please stop at any  lab ([address] if you choose to use my building) to complete your blood and/or urine work that I've ordered for you.    I will contact you with the results at my soonest convenience. I strongly urge you to use [employer] as this is the quickest and easiest way to access your results and receive my correspondences.    I have renewed your chronic medications today.    Follow up with your specialists as previously scheduled.     See me yearly for a Medicare Wellness Visit, and sooner as needed for sick visits

## 2024-12-03 NOTE — PROGRESS NOTES
"Subjective   Reason for Visit: Felix Mancera is an 69 y.o. male here for a Medicare Wellness visit.     Past Medical, Surgical, and Family History reviewed and updated in chart.    Reviewed all medications by prescribing practitioner or clinical pharmacist (such as prescriptions, OTCs, herbal therapies and supplements) and documented in the medical record.    HPI  Doing well since last in.     Re: COPD - GOLD 3E. See pulm notes. No recent exacerbations; his most severe one was back in  6/23 resulting in a MICU stay. Now on Trelegy and JONATHAN/Duonebs a few days a week. Stable and doing well since last appointment.      Re: RA - diagnosed many years ago by a rheumatologist, however hasn't seen one in years. No longer on MTX, but getting his plaquenil through me. Sees his eye doctor regularly. Sx remain stable; no worsening of his quiescent MCP swelling and joint stiffness. No extra-articular symptoms.      Re: HM - cologard current, repeat 2025. PSA no longer indicated. Shots are UTD.     PMHx, FHx, Social Hx, Surg Hx personally reviewed at this appointment. No pertinent findings and/or changes from prior (if applicable).     ROS: Denies wt gain/loss f/c HA LoC CP SOB NVDC. See HPI above, and scanned sheet (if applicable). All other systems are reviewed and are without complaint    Patient Care Team:  Terrence Farley MD as PCP - General  Terrence Farley MD as PCP - [employer] PCP     Review of Systems    Objective   Vitals:  /77   Pulse 88   Temp 36.3 °C (97.3 °F)   Ht 1.727 m (5' 8\")   Wt 77.1 kg (170 lb)   SpO2 92%   BMI 25.85 kg/m²       Physical Exam  Gen: well developed in NAD. AAO x3.  HEENT: NC/AT. Anicteric sclera, symmetric pupils. MMM no thrush.  Neck: Soft, supple. No LAD. No goiter.   CV: RRR nl s1s2 no m/r/g  Pulm: CTAB no w/r/r, good air exchange  GI: soft NTND BS+ no hsm  Ext: WWP no edema  Neuro: II-XII grossly intact, nonfocal systemic findings  MSK: 5/5 strength b/l " UE and LE. Reduced RoM bilateral shoulders 2/2 pain and crepitus.   Gait: unremarkable  Joints: stable mild joint thickening of MCPs and PIPs, however appear chronic in nature with no swelling or erythema    Lab Results   Component Value Date    WBC 8.7 11/09/2023    HGB 15.4 11/09/2023    HCT 45.4 11/09/2023     11/09/2023    CHOL 185 01/27/2024    TRIG 133 01/27/2024    HDL 55.6 01/27/2024    ALT 27 01/26/2024    AST 24 01/26/2024     01/26/2024    K 4.1 01/26/2024     01/26/2024    CREATININE 1.04 01/26/2024    BUN 18 01/26/2024    CO2 28 01/26/2024    PSA 10.17 (H) 06/03/2024    HGBA1C 5.9 (A) 06/20/2022     par    Transthoracic Echo (TTE) Complete     [facility], [address] [place] [address]               Tel [phone] and Fax [phone]    TRANSTHORACIC ECHOCARDIOGRAM REPORT       Patient Name:      KIMBER IRWIN ROSALEE Reading Physician:    [id number] Allan Romero MD  Study Date:        10/11/2024           Ordering Provider:    [id number] MARÍA PARKER  MRN/PID:           [id number]             Fellow:  Accession#:        [id number]         Nurse:  Date of Birth/Age: 1955 / 69 years  Sonographer:          Ashley Guadarrama RDCS  Gender:            M                    Additional Staff:  Height:            172.72 cm            Admit Date:  Weight:            76.66 kg             Admission Status:     Outpatient  BSA / BMI:         1.90 m2 / 25.70      Encounter#:           [id number]                     kg/m2  Blood Pressure:    105/77 mmHg          Department Location:  [facility] Non                                                                Invasive    Study Type:    TRANSTHORACIC ECHO (TTE) COMPLETE  Diagnosis/ICD: Other forms of dyspnea-R06.09  Indication:    RAY  CPT Code:      Echo Complete w Full Doppler-33510    Patient  History:  Pertinent History: Hyperlipidemia, COPD and Dyspnea.    Study Detail: The following Echo studies were performed: 2D, M-Mode, Doppler and                color flow. Technically challenging study due to prominent lung                artifact.       PHYSICIAN INTERPRETATION:  Left Ventricle: Left ventricular ejection fraction is normal, by visual estimate at 70%. There are no regional left ventricular wall motion abnormalities. The left ventricular cavity size was not assessed. Spectral Doppler shows a normal pattern of left ventricular diastolic filling.  Left Atrium: The left atrium is normal in size.  Right Ventricle: The right ventricle is normal in size. There is normal right ventricular global systolic function.  Right Atrium: The right atrium is normal in size.  Aortic Valve: The aortic valve was not well visualized. The aortic valve dimensionless index is 0.70. There is trace aortic valve regurgitation. The peak instantaneous gradient of the aortic valve is 4.2 mmHg. The mean gradient of the aortic valve is 2.3 mmHg.  Mitral Valve: The mitral valve is normal in structure. There is trace mitral valve regurgitation.  Tricuspid Valve: The tricuspid valve is structurally normal. No evidence of tricuspid regurgitation. The right ventricular systolic pressure is unable to be estimated.  Pulmonic Valve: The pulmonic valve is not well visualized. The pulmonic valve regurgitation was not well visualized.  Pericardium: There is no pericardial effusion noted.  Aorta: The aortic root is normal.  Systemic Veins: The inferior vena cava appears normal in size, with IVC inspiratory collapse greater than 50%.  In comparison to the previous echocardiogram(s): Compared with study dated 12/12/2022, no significant change.       CONCLUSIONS:   1. Poorly visualized anatomical structures due to suboptimal image quality.   2. Left ventricular ejection fraction is normal, by visual estimate at 70%.   3. There is normal right  ventricular global systolic function.    QUANTITATIVE DATA SUMMARY:     2D MEASUREMENTS:          Normal Ranges:  RVIDd:           3.12 cm  (0.9-3.6cm)  LVEDV Index:     26 ml/m2       LA VOLUME:                    Normal Ranges:  LA Vol A4C:        23.3 ml    (22+/-6mL/m2)  LA Vol A2C:        24.1 ml  LA Vol BP:         24.3 ml  LA Vol Index A4C:  12.2ml/m2  LA Vol Index A2C:  12.7 ml/m2  LA Vol Index BP:   12.8 ml/m2  LA Area A4C:       10.2 cm2  LA Area A2C:       10.1 cm2  LA Major Axis A4C: 3.8 cm  LA Major Axis A2C: 3.6 cm  LA Volume Index:   12.8 ml/m2  LA Vol A4C:        21.3 ml  LA Vol A2C:        23.0 ml  LA Vol Index BSA:  11.6 ml/m2       RA VOLUME BY A/L METHOD:            Normal Ranges:  RA Vol A4C:              23.3 ml    (8.3-19.5ml)  RA Vol Index A4C:        12.2 ml/m2  RA Area A4C:             10.2 cm2  RA Major Axis A4C:       3.8 cm       AORTA MEASUREMENTS:         Normal Ranges:  Ao Sinus, d:        2.60 cm (2.1-3.5cm)  Ao STJ, d:          2.20 cm (1.7-3.4cm)       LV SYSTOLIC FUNCTION BY 2D PLANIMETRY (MOD):                       Normal Ranges:  EF-A4C View:    61 % (>=55%)  EF-A2C View:    70 %  EF-Biplane:     67 %  EF-Visual:      70 %  LV EF Reported: 70 %       LV DIASTOLIC FUNCTION:             Normal Ranges:  MV Peak E:             0.58 m/s    (0.7-1.2 m/s)  MV Peak A:             0.71 m/s    (0.42-0.7 m/s)  E/A Ratio:             0.82        (1.0-2.2)  MV e'                  0.080 m/s   (>8.0)  MV lateral e'          0.08 m/s  MV medial e'           0.08 m/s  MV A Dur:              102.76 msec  E/e' Ratio:            7.31        (<8.0)  a'                     0.16 m/s  PulmV Sys Ze:         45.36 cm/s  PulmV Mckeon Ze:        27.13 cm/s  PulmV S/D Ze:         1.67  PulmV A Revs Ze:      25.87 cm/s  PulmV A Revs Dur:      125.59 msec       MITRAL VALVE:          Normal Ranges:  MV DT:        196 msec (150-240msec)       AORTIC VALVE:                     Normal Ranges:  AoV Vmax:                 1.03 m/s (<=1.7m/s)  AoV Peak P.2 mmHg (<20mmHg)  AoV Mean P.3 mmHg (1.7-11.5mmHg)  LVOT Max Ze:            0.76 m/s (<=1.1m/s)  AoV VTI:                 19.97 cm (18-25cm)  LVOT VTI:                13.98 cm  LVOT Diameter:           2.23 cm  (1.8-2.4cm)  AoV Area, VTI:           2.74 cm2 (2.5-5.5cm2)  AoV Area,Vmax:           2.88 cm2 (2.5-4.5cm2)  AoV Dimensionless Index: 0.70       RIGHT VENTRICLE:  RV Basal 3.10 cm  RV Mid   2.90 cm  RV Major 6.1 cm  TAPSE:   20.0 mm  RV s'    0.11 m/s       TRICUSPID VALVE/RVSP:         Normal Ranges:  Est. RA Pressure:     3 mmHg  IVC Diam:             1.48 cm       PULMONIC VALVE:          Normal Ranges:  RVOT Vmax:      0.83 m/s (0.6-0.9m/s)  PV Max Ze:     0.8 m/s  (0.6-0.9m/s)  PV Max P.5 mmHg       Pulmonary Veins:  PulmV A Revs Dur: 125.59 msec  PulmV A Revs Ze: 25.87 cm/s  PulmV Mckeon Ze:   27.13 cm/s  PulmV S/D Ze:    1.67  PulmV Sys Ze:    45.36 cm/s       [id number] Allan Romero MD  Electronically signed on 10/11/2024 at 4:53:01 PM       ** Final **      Current Outpatient Medications   Medication Instructions    albuterol 90 mcg/actuation inhaler 2 puffs, inhalation, Every 4 hours PRN    albuterol 90 mcg/actuation inhaler 2 puffs, inhalation, Every 4 hours PRN    albuterol 2.5 mg, nebulization, Every 4 hours PRN    atorvastatin (LIPITOR) 40 mg, oral, Daily    budesonide (PULMICORT) 0.5 mg, nebulization, 2 times daily RT    folic acid (Folvite) 1 mg tablet 1 tablet, oral, Daily    hydroxychloroquine (PLAQUENIL) 200 mg, oral, Daily    ipratropium (ATROVENT) 0.5 mg, nebulization, 4 times daily    ipratropium-albuteroL (Duo-Neb) 0.5-2.5 mg/3 mL nebulizer solution 3 mL, nebulization, 4 times daily RT, As needed    multivitamin with iron (DAILY MULTIPLE VITAMINS/IRON ORAL) 1 tablet, oral, Daily    NON FORMULARY Patient with h/o advanced COPD, currently requiring supplemental oxygen,requires home nebulizzer for  administration of bronchodilators ( Albuterol solution), for rescue therapy. I attest that this is a medically necessary treatment.    Trelegy Ellipta 200-62.5-25 mcg blister with device 1 puff, inhalation, Daily RT          Assessment/Plan   Doing well.     # COPD: GOLD 3E  - continue inhalers (high dose Trelegy, JONATHAN)  - follow up pulm     # RA: appears quiescent  - continue plaquenil  - to f/u with blood work, vision screens     # Chronic pain: marked improvement since surgery  - f/u pain mgmt    # HTN: at/near goal  - continue current regimen  - routine lab work if not recent  - continue lifestyle modifications    # hyperlipidemia: stable  - lipid panel, ASCVD+ score based on these values if age appropriate  - continue statin      # Health Maintenance  - routine blood work  - Colonoscopy: UTD (repeat cologard 2025)  - PSA: no longer indicated due to age   - Immunizations: UTD  - AAA screen: completed      Problem List Items Addressed This Visit       Polyarthritis (Chronic)    COPD (chronic obstructive pulmonary disease) (Multi)    Rheumatoid arthritis    HLD (hyperlipidemia)    Relevant Medications    atorvastatin (Lipitor) 40 mg tablet    Other Relevant Orders    CBC and Auto Differential    Comprehensive Metabolic Panel    Lipid Panel     Other Visit Diagnoses       Healthcare maintenance    -  Primary    Relevant Medications    hydroxychloroquine (Plaquenil) 200 mg tablet    Medicare annual wellness visit, subsequent

## 2024-12-04 ENCOUNTER — LAB (OUTPATIENT)
Dept: LAB | Facility: LAB | Age: 69
End: 2024-12-04
Payer: MEDICARE

## 2024-12-04 DIAGNOSIS — E78.5 HYPERLIPIDEMIA, UNSPECIFIED HYPERLIPIDEMIA TYPE: ICD-10-CM

## 2024-12-04 LAB
ALBUMIN SERPL BCP-MCNC: 4.6 G/DL (ref 3.4–5)
ALP SERPL-CCNC: 37 U/L (ref 33–136)
ALT SERPL W P-5'-P-CCNC: 26 U/L (ref 10–52)
ANION GAP SERPL CALC-SCNC: 13 MMOL/L (ref 10–20)
AST SERPL W P-5'-P-CCNC: 25 U/L (ref 9–39)
BASOPHILS # BLD AUTO: 0.12 X10*3/UL (ref 0–0.1)
BASOPHILS NFR BLD AUTO: 1.5 %
BILIRUB SERPL-MCNC: 0.4 MG/DL (ref 0–1.2)
BUN SERPL-MCNC: 17 MG/DL (ref 6–23)
CALCIUM SERPL-MCNC: 10.2 MG/DL (ref 8.6–10.6)
CHLORIDE SERPL-SCNC: 103 MMOL/L (ref 98–107)
CHOLEST SERPL-MCNC: 174 MG/DL (ref 0–199)
CHOLESTEROL/HDL RATIO: 3.1
CO2 SERPL-SCNC: 31 MMOL/L (ref 21–32)
CREAT SERPL-MCNC: 1.28 MG/DL (ref 0.5–1.3)
EGFRCR SERPLBLD CKD-EPI 2021: 60 ML/MIN/1.73M*2
EOSINOPHIL # BLD AUTO: 0.4 X10*3/UL (ref 0–0.7)
EOSINOPHIL NFR BLD AUTO: 4.9 %
ERYTHROCYTE [DISTWIDTH] IN BLOOD BY AUTOMATED COUNT: 12.7 % (ref 11.5–14.5)
GLUCOSE SERPL-MCNC: 114 MG/DL (ref 74–99)
HCT VFR BLD AUTO: 49.3 % (ref 41–52)
HDLC SERPL-MCNC: 55.7 MG/DL
HGB BLD-MCNC: 16.3 G/DL (ref 13.5–17.5)
IMM GRANULOCYTES # BLD AUTO: 0.03 X10*3/UL (ref 0–0.7)
IMM GRANULOCYTES NFR BLD AUTO: 0.4 % (ref 0–0.9)
LDLC SERPL CALC-MCNC: 97 MG/DL
LYMPHOCYTES # BLD AUTO: 2.89 X10*3/UL (ref 1.2–4.8)
LYMPHOCYTES NFR BLD AUTO: 35.5 %
MCH RBC QN AUTO: 29.9 PG (ref 26–34)
MCHC RBC AUTO-ENTMCNC: 33.1 G/DL (ref 32–36)
MCV RBC AUTO: 91 FL (ref 80–100)
MONOCYTES # BLD AUTO: 0.81 X10*3/UL (ref 0.1–1)
MONOCYTES NFR BLD AUTO: 9.9 %
NEUTROPHILS # BLD AUTO: 3.9 X10*3/UL (ref 1.2–7.7)
NEUTROPHILS NFR BLD AUTO: 47.8 %
NON HDL CHOLESTEROL: 118 MG/DL (ref 0–149)
NRBC BLD-RTO: 0 /100 WBCS (ref 0–0)
PLATELET # BLD AUTO: 301 X10*3/UL (ref 150–450)
POTASSIUM SERPL-SCNC: 5.1 MMOL/L (ref 3.5–5.3)
PROT SERPL-MCNC: 7.8 G/DL (ref 6.4–8.2)
RBC # BLD AUTO: 5.45 X10*6/UL (ref 4.5–5.9)
SODIUM SERPL-SCNC: 142 MMOL/L (ref 136–145)
TRIGL SERPL-MCNC: 106 MG/DL (ref 0–149)
VLDL: 21 MG/DL (ref 0–40)
WBC # BLD AUTO: 8.2 X10*3/UL (ref 4.4–11.3)

## 2024-12-04 PROCEDURE — 80061 LIPID PANEL: CPT

## 2024-12-04 PROCEDURE — 36415 COLL VENOUS BLD VENIPUNCTURE: CPT

## 2024-12-04 PROCEDURE — 80053 COMPREHEN METABOLIC PANEL: CPT

## 2024-12-04 PROCEDURE — 85025 COMPLETE CBC W/AUTO DIFF WBC: CPT

## 2024-12-08 ASSESSMENT — EXTERNAL EXAM - RIGHT EYE: OD_EXAM: NORMAL

## 2024-12-08 ASSESSMENT — CUP TO DISC RATIO
OS_RATIO: .25
OD_RATIO: .25

## 2024-12-08 ASSESSMENT — SLIT LAMP EXAM - LIDS
COMMENTS: GOOD POSITION, DERMATOCHALASIS
COMMENTS: GOOD POSITION, DERMATOCHALASIS

## 2024-12-08 ASSESSMENT — EXTERNAL EXAM - LEFT EYE: OS_EXAM: NORMAL

## 2024-12-08 NOTE — PROGRESS NOTES
Rheumatoid vtlnbmvpnB24.9  Long-term use of ikjjygijhpxvkldwatK01.899  -On Hydroxychloroquine 200mg PO daily since 2012. Color plates = 11/11 (12/10/24).  -HVF 10-2 (12/10/24) - OD: 15/15FL 5%FP 5% FN. WNL. OS: 14/15FL 1%FN. Scattered, but WNL. Stable from 10/22/19.   -OCT macula (12/10/24) - Normal thickness and contour OU. Intact EZ OU. No edema OU. 276/273. Stable from 10/22/19.   -Normal renal function.  -Risk to vision increases as length of time on medication increases. Recommend routine monitoring. Call immediately if experiences any change in visual acuity/color vision. F/u 1 year for repeat color plates, HVF 10-2 and OCT macula.    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 Monitor.   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     Family history of macular mmkcfzkwtcydI24.518  -(+)FH - mother (history of injections)  -Normal macular exam today. Monitor.     JzkyepblpK45.00  KrcieqicylY95.4  -New Rx for glasses given per patient request. Patient's signature obtained to acknowledge and confirm that a paper copy of glasses Rx was given to patient in compliance with [facility] Eyeglass Rule. Electronic copy of Rx will also be available via [facility]REL[facility]/EPIC.       No history of intraocular surgery/refractive surgery.   No FH of AMD

## 2024-12-10 ENCOUNTER — APPOINTMENT (OUTPATIENT)
Dept: OPHTHALMOLOGY | Facility: CLINIC | Age: 69
End: 2024-12-10
Payer: MEDICARE

## 2024-12-10 DIAGNOSIS — H25.811 COMBINED FORM OF AGE-RELATED CATARACT, RIGHT EYE: ICD-10-CM

## 2024-12-10 DIAGNOSIS — H04.123 DRY EYES, BILATERAL: ICD-10-CM

## 2024-12-10 DIAGNOSIS — Z83.518 FAMILY HISTORY OF MACULAR DEGENERATION: ICD-10-CM

## 2024-12-10 DIAGNOSIS — M06.9 RHEUMATOID ARTHRITIS, INVOLVING UNSPECIFIED SITE, UNSPECIFIED WHETHER RHEUMATOID FACTOR PRESENT (MULTI): Primary | ICD-10-CM

## 2024-12-10 DIAGNOSIS — H52.03 HYPEROPIA, BILATERAL: ICD-10-CM

## 2024-12-10 DIAGNOSIS — H25.812 COMBINED FORM OF AGE-RELATED CATARACT, LEFT EYE: ICD-10-CM

## 2024-12-10 DIAGNOSIS — H52.4 PRESBYOPIA: ICD-10-CM

## 2024-12-10 DIAGNOSIS — Z79.899 LONG-TERM USE OF HYDROXYCHLOROQUINE: ICD-10-CM

## 2024-12-10 DIAGNOSIS — J43.9 PULMONARY EMPHYSEMA, UNSPECIFIED EMPHYSEMA TYPE (MULTI): ICD-10-CM

## 2024-12-10 PROCEDURE — 92015 DETERMINE REFRACTIVE STATE: CPT | Performed by: OPHTHALMOLOGY

## 2024-12-10 PROCEDURE — 92134 CPTRZ OPH DX IMG PST SGM RTA: CPT | Performed by: OPHTHALMOLOGY

## 2024-12-10 PROCEDURE — 99214 OFFICE O/P EST MOD 30 MIN: CPT | Performed by: OPHTHALMOLOGY

## 2024-12-10 PROCEDURE — 92083 EXTENDED VISUAL FIELD XM: CPT | Performed by: OPHTHALMOLOGY

## 2024-12-10 RX ORDER — ALBUTEROL SULFATE 0.83 MG/ML
2.5 SOLUTION RESPIRATORY (INHALATION) EVERY 4 HOURS PRN
Qty: 270 ML | Refills: 3 | Status: SHIPPED | OUTPATIENT
Start: 2024-12-10

## 2024-12-10 ASSESSMENT — REFRACTION_MANIFEST
OD_SPHERE: +3.25
OS_SPHERE: +2.75
OS_CYLINDER: SPHERE
OS_ADD: +2.50
OD_ADD: +2.50
OD_AXIS: 080
OD_CYLINDER: -0.75

## 2024-12-10 ASSESSMENT — ENCOUNTER SYMPTOMS
PSYCHIATRIC NEGATIVE: 0
MUSCULOSKELETAL NEGATIVE: 0
CARDIOVASCULAR NEGATIVE: 0
NEUROLOGICAL NEGATIVE: 0
CONSTITUTIONAL NEGATIVE: 0
ENDOCRINE NEGATIVE: 0
HEMATOLOGIC/LYMPHATIC NEGATIVE: 0
RESPIRATORY NEGATIVE: 0
GASTROINTESTINAL NEGATIVE: 0
EYES NEGATIVE: 1
ALLERGIC/IMMUNOLOGIC NEGATIVE: 0

## 2024-12-10 ASSESSMENT — REFRACTION_WEARINGRX
OS_ADD: +2.50
OS_CYLINDER: SPHERE
OD_SPHERE: +3.25
OS_SPHERE: +2.50
OD_ADD: +2.50
OD_AXIS: 080
OD_CYLINDER: -0.75

## 2024-12-10 ASSESSMENT — VISUAL ACUITY
OD_BAT_MED: 20/25
METHOD: SNELLEN - LINEAR
OS_BAT_MED: 20/20
OS_CC: 20/25
CORRECTION_TYPE: GLASSES
OD_CC: 20/25+

## 2024-12-10 ASSESSMENT — TONOMETRY
IOP_METHOD: GOLDMANN APPLANATION
OS_IOP_MMHG: 15
OD_IOP_MMHG: 15

## 2024-12-18 ENCOUNTER — TELEMEDICINE (OUTPATIENT)
Dept: PULMONOLOGY | Facility: CLINIC | Age: 69
End: 2024-12-18
Payer: MEDICARE

## 2024-12-18 DIAGNOSIS — J43.9 PULMONARY EMPHYSEMA, UNSPECIFIED EMPHYSEMA TYPE (MULTI): ICD-10-CM

## 2024-12-18 DIAGNOSIS — R06.00 DYSPNEA, UNSPECIFIED TYPE: ICD-10-CM

## 2024-12-18 DIAGNOSIS — J44.9 CHRONIC OBSTRUCTIVE PULMONARY DISEASE, UNSPECIFIED COPD TYPE (MULTI): Primary | ICD-10-CM

## 2024-12-18 PROCEDURE — 99214 OFFICE O/P EST MOD 30 MIN: CPT | Performed by: STUDENT IN AN ORGANIZED HEALTH CARE EDUCATION/TRAINING PROGRAM

## 2024-12-18 PROCEDURE — G2211 COMPLEX E/M VISIT ADD ON: HCPCS | Performed by: STUDENT IN AN ORGANIZED HEALTH CARE EDUCATION/TRAINING PROGRAM

## 2024-12-18 PROCEDURE — 1123F ACP DISCUSS/DSCN MKR DOCD: CPT | Performed by: STUDENT IN AN ORGANIZED HEALTH CARE EDUCATION/TRAINING PROGRAM

## 2024-12-18 ASSESSMENT — ENCOUNTER SYMPTOMS
ENDOCRINE NEGATIVE: 1
EYES NEGATIVE: 1
CARDIOVASCULAR NEGATIVE: 1
GASTROINTESTINAL NEGATIVE: 1
CONSTITUTIONAL NEGATIVE: 1

## 2024-12-18 NOTE — PROGRESS NOTES
Department of Medicine I Division of Pulmonary, Critical Care, and Sleep Medicine   [id number] [place] [address]  Phone: [phone]  Fax: [phone]    History of Present Illness   Felix Mancera is a 69 y.o. male presenting for follow up of COPD.    12/18/2024   Virtual visit --A/V   Is enrolled in [facility]---2 days a week for 36 sessions   Has previously completed pulmonary rehab   Pulmonary medications: trelegy, albuterol prn   Testing since last time was largely normal--no evidenice of immunodeficiency, normal abg (no hypercapnia), echo showed EF of 70%, RV was normal   Most recent eosinophil count was 400     Still has dyspnea with activity   Has not had any bronchitis episode since the last visit--also was the last time he went to [place] (son's home likely had mold --multiple family members with respiratory symptoms)       9/18/2024   Since the last visit   Did go to urgent care since last time--got abx and steroids     Having more exertional symptoms  Denies any orthopnea per say but does sleep propped up due to pain issues. Denies pnd.  Denies any swelling in the legs   Did gain 20lbs     Pulmonary medications:  trelegy, albuterol       4/18/2024 (virtual visit--phone call)   Been doing well since the last visit   Went to [place] and had symptoms when he came back --went to urgent care got prednisone and azithromycin   Did do pulmonary rehab   Family is planning to move back to [place]   A1AT level is normal       10/2023  Pulmonology Questionnaire (Submitted on 10/11/2023)  Chief Complaint: Primary symptoms  Do you experience frequent throat clearing?: Yes  Chronicity: chronic  When did you first notice your symptoms?: more than 1 year ago  How often do your symptoms occur?: intermittently  Since you first noticed this problem, how has it changed?: gradually improving  Which of the following makes your symptoms worse?: climbing stairs, strenuous activity, URI  Which  of the following makes your symptoms better?: ipratropium    Has issues when he goes to his son's house in [place], [place]   Started pulmonary rehab a few months ago  Triggers: humid air,   Pulmonary medications: trelegy, duoneb (once a week)    Last visit June 2023     Since last visit on 5/4/23 with Dr. Jung, patient unfortunately had a repeat hospital admission for COPD exacerbation from 6/19 through 6/24/2023. He had presented with significant shortness of breath for 3 days and required BiPAP in the ED; briefly on Precedex and admitted to the MICU. He was transitioned to supplemental O2 and improved with his ABGs. There was low concern for bacterial pneumonia. Was eventually transitioned down to 2 L nasal cannula and transferred to the floor. Was continued on a steroid course extended to 7 days. Was referred to pulmonary rehab (Per chart review, patient had refused pulmonary rehab previously). Was previously advised Roflumilast as part of a study (but was told it wouldn't’t be paid for and he didn't want to start paying for it himself). He never ended up starting on it. States he will be starting up pulmonary rehab soon. He has oxygen at home; which was originally given to him for some mild nocturnal hypoxia. Tried it for a couple months; stopped using it. He checks his SpO2 at home periodically at rest as well as after exertion. Can drop as low as 88-90%. Not putting oxygen on during the day. Using Trelegy 100 daily. Was hardly using his albuterol prior to going into the hospital. After coming home after this recent exacerbation, has only needed it about 1-2x. He has a chronic cough - worse in the AM. Mildly productive with white/clear sputum. Admits to fairly frequent wheezing. RAY is fairly significant - he can feel winded moving around his house. Denies SOB at rest. Has intermittent runny nose - takes OTC Mucinex (I advised switching to Cetirizine). Denies GERD. Denies Chest pain, palpitations, lower leg  edema.  CAT Today: 17        Last visit 1/2023: started trelegy       #COPD  No alpha 1 antitrypsin testing done since his last visit, reminded patient today.   Mr. Mancera has started using trelegy and has no complaints with it. He has started working back at the [employer] with some work modifications and is very happy to be back working. He has only had to stop occasionally and otherwise modifies his work for his shoulder pain.   He had a cold 1 month ago but did not require any steroids or physician evaluation. He did use his albuterol a little during that time.   He does have a cough in the AM and seldom wheezes. He cites living in an old [place] house as part of the triggers for his symptoms.   He is not interested in pulmonary rehab.     #Nocturnal hypoxia  He was prescribed nocturnal oxygen but hates it and stopped using i. He reportst that he sleeps well, with no snoring, or daytime fatigue.   He is not interested in a sleep medicine referral.       Past Medical History: COPD, RA, HLD, tobacco use  Surgical History: C spine  Social History: former smoker 35 pack years, quit 2012, [place],   Family History: father - COPD, emphysema, grandfather - emphysema    6MWT 326 m, 63%  PFT 1/5/23: FVC 91%, FEV 34%, ratio 28, %, gas trapping present, DLCO 50  Review of Systems  Review of Systems   Constitutional: Negative.    HENT: Negative.     Eyes: Negative.    Respiratory:          As per hpi   Cardiovascular: Negative.    Gastrointestinal: Negative.    Endocrine: Negative.    Genitourinary: Negative.      All other review of systems are negative and/or non-contributory.    Past Medical History   He has a past medical history of Cataract, COPD (chronic obstructive pulmonary disease) (Multi) (06/1/22), Other specified health status, Rheumatoid arthritis, and Sinusitis.    Immunizations     Immunization History   Administered Date(s) Administered    COVID-19, mRNA, LNP-S, PF, 30 mcg/0.3 mL dose  10/30/2021    Flu vaccine, quadrivalent, high-dose, preservative free, age 65y+ (FLUZONE) 10/13/2022, 09/07/2024    Influenza, Seasonal, Quadrivalent, Adjuvanted 10/10/2023    Moderna COVID-19 vaccine, 12 years and older (50mcg/0.5mL)(Spikevax) 09/07/2024    Moderna COVID-19 vaccine, bivalent, blue cap/gray label *Check age/dose* 10/17/2022    Moderna SARS-CoV-2 Vaccination 03/06/2021, 04/20/2022    Pfizer COVID-19 vaccine, 12 years and older, (30mcg/0.3mL) (Comirnaty) 10/10/2023    Pneumococcal conjugate vaccine, 20-valent (PREVNAR 20) 06/08/2022    Pneumococcal polysaccharide vaccine, 23-valent, age 2 years and older (PNEUMOVAX 23) 08/03/2020, 01/05/2023    RESPIRATORY SYNCYTIAL VIRUS (RSV), ELIGIBLE PREGNANT PTS, 0.5 ML (ABRYSVO) 01/18/2024    Tdap vaccine, age 7 year and older (BOOSTRIX, ADACEL) 05/28/2011, 01/26/2022    Zoster vaccine, recombinant, adult (SHINGRIX) 10/01/2018       Medications and Allergies     Current Outpatient Medications   Medication Instructions    albuterol 90 mcg/actuation inhaler 2 puffs, inhalation, Every 4 hours PRN    albuterol 90 mcg/actuation inhaler 2 puffs, inhalation, Every 4 hours PRN    albuterol 2.5 mg, nebulization, Every 4 hours PRN    atorvastatin (LIPITOR) 40 mg, oral, Daily    budesonide (PULMICORT) 0.5 mg, nebulization, 2 times daily RT    folic acid (Folvite) 1 mg tablet 1 tablet, Daily    hydroxychloroquine (PLAQUENIL) 200 mg, oral, Daily    ipratropium (ATROVENT) 0.5 mg, nebulization, 4 times daily    ipratropium-albuteroL (Duo-Neb) 0.5-2.5 mg/3 mL nebulizer solution 3 mL, 4 times daily RT    multivitamin with iron (DAILY MULTIPLE VITAMINS/IRON ORAL) 1 tablet, Daily    NON FORMULARY Patient with h/o advanced COPD, currently requiring supplemental oxygen,requires home nebulizzer for administration of bronchodilators ( Albuterol solution), for rescue therapy. I attest that this is a medically necessary treatment.    Trelegy Ellipta 200-62.5-25 mcg blister with device 1  "puff, inhalation, Daily RT      Patient has no known allergies.    Social History   He reports that he quit smoking about 19 years ago. His smoking use included cigarettes. He started smoking about 50 years ago. He has never used smokeless tobacco. He reports current alcohol use. He reports that he does not currently use drugs after having used the following drugs: Hydrocodone, Marijuana, and Oxycodone.    Smoking History: see HPI   Exposure/Job History: see HPI     Family History     Family History   Problem Relation Name Age of Onset    Other (AMD) Mother Shalonda     Hypertension Mother Shalonda            Surgical History   He has a past surgical history that includes Other surgical history (01/28/2021) and Spine surgery (1/22).  Cervical spine surgery    Physical Exam   There were no vitals taken for this visit.     Virtual visit  In no acute distress      Results   Pulmonary Function Tests:      No results found for: \"FEV1\", \"[id number]\"    Chest Radiograph:  XR chest 1 view 06/20/2023    Narrative  Interpreted By:  LIYAH PINO MD and GILDARDO WAY DO  MRN: [id number]  Patient Name: KIMBER TURK    STUDY:  CHEST 1 VIEW;  6/20/2023 11:05 am    INDICATION:  dyspnea, oxygen requirement .    COMPARISON:  Chest radiograph 06/19/2023 CT chest 08/12/2022.    ACCESSION NUMBER(S):  [id number]    ORDERING CLINICIAN:  CHAGO JAMESON    FINDINGS:  AP radiograph of the chest was provided.    CARDIOMEDIASTINAL SILHOUETTE:  Cardiomediastinal silhouette is normal in size and configuration.    LUNGS:  Redemonstration of hyperinflated lungs with diffuse coarsened  interstitial markings, similar to prior. Bibasilar streaky opacities  favored to represent atelectasis. No new focal consolidation, pleural  effusion, pneumothorax.    ABDOMEN:  No remarkable upper abdominal findings.    BONES:  Remote rib fractures are again seen. Otherwise, no acute osseous  pathology.    Impression  Mild bibasilar atelectasis superimposed on chronic " lung changes.  Otherwise, no new focal consolidation, pleural effusion, or  pneumothorax.    I personally reviewed the images/study and I agree with the findings  as stated above by resident physician, Dr. Daniel Schreiber. The  study was interpreted at [facility] in [facility].      Chest CT Scan:  CT lung screening low dose 09/01/2023    Narrative  Interpreted By:  ENRICO GROSSMAN MD  MRN: [id number]  Patient Name: KIMBER TURK    STUDY:   CT CHEST LOW DOSE FOR LUNG SCREENING WO CONTRAST;  9/1/2023 3:23 pm    INDICATION:  cough, dyspnea on exertion  R91.8: Lung nodules.    COMPARISON:  08/12/2022.    ACCESSION NUMBER(S):  [id number]    ORDERING CLINICIAN:  SARITHA VALDES    TECHNIQUE:  Helical data acquisition of the chest was obtained without IV  contrast material.  Images were reformatted in axial, coronal, and  sagittal planes.    FINDINGS:  LUNGS AND AIRWAYS:  The trachea and central airways are patent. No endobronchial lesion  is seen.    There is severe emphysematous changes. There is diffuse peribronchial  thickening.There is no focal consolidation, pleural effusion, or  pneumothorax.    There is a 3 mm right lower lobe parenchymal lung nodule unchanged.  There are no suspicious parenchymal lung nodules.    MEDIASTINUM AND ZACH, LOWER NECK AND AXILLA:  The visualized thyroid gland is within normal limits.  Scattered mediastinal lymph nodes are felt to be reactive in nature  Esophagus appears within normal limits as seen.    HEART AND VESSELS:  There is mild atherosclerotic changes of the thoracic aorta.  Main pulmonary artery and its branches are normal in caliber.  There is mild coronary artery calcifications.  The cardiac chambers are not enlarged.  There is no pericardial effusion seen.    UPPER ABDOMEN:  The visualized subdiaphragmatic structures demonstrate no remarkable  findings.        CHEST WALL AND OSSEOUS STRUCTURES:  Chest wall is within normal  limits.  No acute osseous pathology.There are no suspicious osseous lesions.    Impression  1. There are no suspicious parenchymal lung nodules. Recommend  continued 1 year low-dose chest CT for surveillance.        LUNG RADS CATEGORY:  Lung-RADS 2 (Benign Appearance or Indolent behavior): Continue with  annual screening in 12 months,  CT Chest Lung Ca Screen Initial  or Follow up LR1/LR2/Continuation of Screening Low Dose recommended  as per American College of Radiology Guidelines Lung-RADS Version  2022.       CT Chest 9/18/2024   IMPRESSION:  1. Few small bilateral noncalcified pulmonary nodules measuring up  to 4 mm, as described above. Continued screening with low-dose  noncontrast chest CT in 12 months (from current date) is recommended.  2. Moderate to severe diffuse emphysema.  3. Estimated coronary artery calcium score is 57* which correlates  with at least 35th percentile rank as compared to matched SALGADO-study  subjects(https://[email or web address]-nhlbi.org/Calcium/input.aspx).    LUNG RADS CATEGORY:  Lung Rad: Lung-RADS 2 (Benign Appearance or Indolent Behavior)    ECHO:  Echocardiogram     [facility], [address] [place] [address]  Tel [phone] and Fax [phone]    TRANSTHORACIC ECHOCARDIOGRAM REPORT      Patient Name:     KIMBER Godoy Physician:  [id number] G[address]LEGOS  Study Date:       12/12/2022          Referring           SARITHA VALDES  Physician:  MRN/PID:          [id number]            PCP:                Terrence Farley MD  Accession/Order#: [id number]        Department          [facility]  Location:           Invasive  YOB: 1955            Fellow:  Gender:           M                   Nurse:  Admit Date:                           Sonographer:        Graciela Willson [facility]  Admission Status: Outpatient          Additional Staff:  Height:           172.72 cm           CC Report to:  Weight:           72.57 kg             Study Type:         Echocardiogram  BSA:              1.86 m2  Blood Pressure: 115 /77 mmHg    Diagnosis/ICD: R06.00-Dyspnea, unspecified  Indication:    Chronic dyspnea; COPD; RAY  Procedure/CPT: Echo Complete w Full [id number]    Patient History:  Smoker:            Former.  Pertinent History: Dyspnea and COPD.    Study Detail: The following Echo studies were performed: 2D, M-Mode, Doppler and  color flow. Technically challenging study due to prominent lung  artifact.      PHYSICIAN INTERPRETATION:  Left Ventricle: The left ventricular systolic function is normal. There are no regional wall motion abnormalities. The left ventricular cavity size is normal. Left ventricular diastolic filling was indeterminate.  Left Atrium: The left atrium was not well visualized.  Right Ventricle: The right ventricle is normal in size. There is normal right ventricular global systolic function.  Right Atrium: The right atrium is normal in size.  Aortic Valve: The aortic valve was not well visualized. There is indeterminate aortic valve regurgitation. The peak instantaneous gradient of the aortic valve is 5.3 mmHg.  Mitral Valve: The mitral valve is normal in structure. There is trace mitral valve regurgitation.  Tricuspid Valve: The tricuspid valve was not well visualized. There is trace tricuspid regurgitation. The right ventricular systolic pressure is unable to be estimated.  Pulmonic Valve: The pulmonic valve is structurally normal. There is no indication of pulmonic valve regurgitation.  Pericardium: There is no pericardial effusion noted.  Aorta: The aortic root was not well visualized.  Systemic Veins: The inferior vena cava size appears small.  In comparison to the previous echocardiogram(s): There are no prior studies on this patient for comparison purposes.      CONCLUSIONS:  1. Left ventricular systolic function is normal.  2. Poorly visualized anatomical structures due to suboptimal image quality.    QUANTITATIVE  DATA SUMMARY:  LA VOLUME:  Normal Ranges:  LA Vol A4C:        17.2 ml    (22+/-6mL/m2)  LA Vol A2C:        21.1 ml  LA Vol BP:         19.5 ml  LA Vol Index A4C:  9.3 ml/m2  LA Vol Index A2C:  11.3 ml/m2  LA Vol Index BP:   10.5 ml/m2  LA Area A4C:       9.0 cm2  LA Area A2C:       10.2 cm2  LA Major Axis A4C: 4.0 cm  LA Major Axis A2C: 4.2 cm  LA Volume Index:   10.5 ml/m2  LA Vol A4C:        15.3 ml  LA Vol A2C:        20.3 ml    M-MODE MEASUREMENTS:  Normal Ranges:  Ao Root: 3.10 cm (2.0-3.7cm)    LV SYSTOLIC FUNCTION BY 2D PLANIMETRY (MOD):  Normal Ranges:  EF-A4C View: 58.0 % (>=55%)  EF-A2C View: 62.6 %  EF-Biplane:  60.0 %    LV DIASTOLIC FUNCTION:  Normal Ranges:  MV Peak E:        0.78 m/s    (0.7-1.2 m/s)  MV Peak A:        0.72 m/s    (0.42-0.7 m/s)  E/A Ratio:        1.08        (1.0-2.2)  MV lateral e'     0.07 m/s  MV medial e'      0.06 m/s  MV A Dur:         106.11 msec  PulmV Sys Ze:    53.95 cm/s  PulmV Mckeon Ze:   37.44 cm/s  PulmV S/D Ze:    1.44  PulmV A Revs Ze: 30.86 cm/s  PulmV A Revs Dur: 85.35 msec    MITRAL VALVE:  Normal Ranges:  MV Vmax:    0.91 m/s (<=1.3m/s)  MV peak PG: 3.3 mmHg (<5mmHg)  MV mean P.5 mmHg (<2mmHg)  MV VTI:     18.81 cm (10-13cm)  MV DT:      215 msec (150-240msec)    AORTIC VALVE:  Normal Ranges:  AoV Vmax:      1.15 m/s (<=1.7m/s)  AoV Peak P.3 mmHg (<20mmHg)  LVOT Max Ze:  0.93 m/s (<=1.1m/s)  LVOT VTI:      17.31 cm  LVOT Diameter: 1.96 cm  (1.8-2.4cm)  AoV Area,Vmax: 2.44 cm2 (2.5-4.5cm2)    RIGHT VENTRICLE:  RV 1   2.7 cm  RV 2   2.2 cm  RV 3   6.0 cm  TAPSE: 21.0 mm    TRICUSPID VALVE/RVSP:  Normal Ranges:  Est. RA Pressure: 3 mmHg    PULMONIC VALVE:  Normal Ranges:  PV Accel Time: 104 msec (>120ms)  PV Max Ze:    1.0 m/s  (0.6-0.9m/s)  PV Max P.0 mmHg    Pulmonary Veins:  PulmV A Revs Dur: 85.35 msec  PulmV A Revs Ze: 30.86 cm/s  PulmV Mckeon Ze:   37.44 cm/s  PulmV S/D Ze:    1.44  PulmV Sys Ze:    53.95 cm/s      [id number]venido Valero  "DO  Electronically signed on 12/12/2022 at 5:25:10 PM        Final        Labs:  Lab Results   Component Value Date    WBC 8.2 12/04/2024    HGB 16.3 12/04/2024    HCT 49.3 12/04/2024    MCV 91 12/04/2024     12/04/2024       Lab Results   Component Value Date    CREATININE 1.28 12/04/2024    BUN 17 12/04/2024     12/04/2024    K 5.1 12/04/2024     12/04/2024    CO2 31 12/04/2024        Lab Results   Component Value Date    SHAHNAZ NEGATIVE 10/01/2019    RF 11 10/01/2018    SEDRATE 7 09/09/2019        IgE: No results found for: \"IGE\"   Respiratory Allergy Panel:  AEC:   Eosinophils Absolute (x10*3/uL)   Date Value   12/04/2024 0.40     Eosinophils % (%)   Date Value   12/04/2024 4.9       Cultures:  No results found for: \"AFBCX\"   No results found for: \"RESPCULTCYFI\"    No results found for the last 90 days.  C     Assessment and Plan        1. Severe COPD: GOLD 3E COPD--appears to have some environmental trigger--may be related to an exposures at his son's house in [place] or a virus he may have picked up while traveling/from [employer].  Reports worsening symptoms difficulty working at [employer].  He is currently enrolled in [facility] virtual rehab program   - continue Trelegy 200 daily  - continue duoneb prn   - Continue albuterol HFA PRN  - some temporality with staying in [place] and his symptoms (family was also having symptoms)--they since moved out of the house.  He is stil having dyspnea symptoms but no longer the bronchitis episdoes   -suggested LVRS referral given hyperinflation on pft and emphysema --we briefly spoke about this, pt would like to see how he does after completing [facility] program to see if this improves his breathing first   -Since his family is moving out of the house in [place], will see if he has any further exacerbations   -we can consider dupixent vs roflumilast if he is having recurrent exacerbations--not having enough exacerbations---was most likely related to exposure in " [place]           Lung Nodules  -most recent scan 9/2024 with stable 4mm lung nodules  -recommend annual LDCT chest for lung cancer screening--next due in September 2025        Follow up after completion of [facility] rehab or sooner if needed --~ 4 months             Xiomara Guevara MD  12/18/2024

## 2024-12-18 NOTE — PATIENT INSTRUCTIONS
Thank you for visiting the Pulmonary Clinic today.   Your breathing medications: continue current inhaler regimen  Continue [facility] program, let me know when you complete it and we can make a re-assessment if we need to proceed with lung volume reduction referral   Return in 4-5 months  If you have questions or concerns, call [phone] (option 4)

## 2025-04-07 DIAGNOSIS — Z00.00 HEALTHCARE MAINTENANCE: ICD-10-CM

## 2025-04-07 DIAGNOSIS — E78.5 HYPERLIPIDEMIA, UNSPECIFIED HYPERLIPIDEMIA TYPE: ICD-10-CM

## 2025-04-07 RX ORDER — HYDROXYCHLOROQUINE SULFATE 200 MG/1
200 TABLET, FILM COATED ORAL DAILY
Qty: 90 TABLET | Refills: 3 | Status: SHIPPED | OUTPATIENT
Start: 2025-04-07

## 2025-04-23 ENCOUNTER — OFFICE VISIT (OUTPATIENT)
Dept: URGENT CARE | Age: 70
End: 2025-04-23
Payer: MEDICARE

## 2025-04-23 VITALS
SYSTOLIC BLOOD PRESSURE: 134 MMHG | TEMPERATURE: 98.3 F | RESPIRATION RATE: 21 BRPM | DIASTOLIC BLOOD PRESSURE: 76 MMHG | HEIGHT: 68 IN | OXYGEN SATURATION: 95 % | HEART RATE: 104 BPM | BODY MASS INDEX: 25.76 KG/M2 | WEIGHT: 170 LBS

## 2025-04-23 DIAGNOSIS — J02.9 SORE THROAT: ICD-10-CM

## 2025-04-23 LAB
POC HUMAN RHINOVIRUS PCR: NEGATIVE
POC INFLUENZA A VIRUS PCR: NEGATIVE
POC INFLUENZA B VIRUS PCR: NEGATIVE
POC RESPIRATORY SYNCYTIAL VIRUS PCR: NEGATIVE
POC STREPTOCOCCUS PYOGENES (GROUP A STREP) PCR: NEGATIVE

## 2025-04-23 NOTE — PATIENT INSTRUCTIONS
Start warm salt water gargles put a little bit of salt and warm water gargle and spit it out follow-up with your primary care doctor tomorrow and follow-up with ENT for further management.  Go to the emergency room with any worsening symptoms any difficulty swallowing high fever worsening symptoms go to the ER.

## 2025-04-23 NOTE — PROGRESS NOTES
"Subjective   Patient ID: Felix Mancera is a 69 y.o. male. They present today with a chief complaint of Sore Throat (Sore throat for about two weeks but lately eating irritates and makes him cough. Hx of COPD. Frequent unexplained heartburn).    History of Present Illness  HPI    Past Medical History  Allergies as of 04/23/2025    (No Known Allergies)       Prescriptions Prior to Admission[1]     Medical History[2]    Surgical History[3]     reports that he quit smoking about 19 years ago. His smoking use included cigarettes. He started smoking about 51 years ago. He has never used smokeless tobacco. He reports current alcohol use. He reports that he does not currently use drugs after having used the following drugs: Hydrocodone, Marijuana, and Oxycodone.    Review of Systems  Review of Systems   HENT:  Positive for sore throat.    All other systems reviewed and are negative.                                 Objective    Vitals:    04/23/25 1925   BP: 134/76   Pulse: 104   Resp: 21   Temp: 36.8 °C (98.3 °F)   SpO2: 95%   Weight: 77.1 kg (170 lb)   Height: 1.727 m (5' 8\")     No LMP for male patient.    Physical Exam  Vitals reviewed.   Constitutional:       Appearance: Normal appearance.   HENT:      Head: Normocephalic and atraumatic.      Right Ear: Tympanic membrane, ear canal and external ear normal.      Left Ear: Tympanic membrane, ear canal and external ear normal.      Nose: Nose normal.      Mouth/Throat:      Mouth: Mucous membranes are moist.      Pharynx: Oropharynx is clear.   Eyes:      Extraocular Movements: Extraocular movements intact.      Conjunctiva/sclera: Conjunctivae normal.      Pupils: Pupils are equal, round, and reactive to light.   Cardiovascular:      Rate and Rhythm: Normal rate and regular rhythm.      Pulses: Normal pulses.      Heart sounds: Normal heart sounds.   Pulmonary:      Effort: Pulmonary effort is normal.      Breath sounds: Normal breath sounds.   Musculoskeletal:    "      General: Normal range of motion.      Cervical back: Normal range of motion.   Skin:     General: Skin is warm.      Capillary Refill: Capillary refill takes less than 2 seconds.   Neurological:      General: No focal deficit present.      Mental Status: He is alert and oriented to person, place, and time.   Psychiatric:         Mood and Affect: Mood normal.         Behavior: Behavior normal.         Procedures    Point of Care Test & Imaging Results from this visit  Results for orders placed or performed in visit on 04/23/25   POCT SPOTFIRE R/ST Panel Mini w/Strep A ([facility]) manually resulted   Result Value Ref Range    POC Group A Strep, PCR Negative Negative    POC Respiratory Syncytial Virus PCR Negative Negative    POC Influenza A Virus PCR Negative Negative    POC Influenza B Virus PCR Negative Negative    POC Human Rhinovirus PCR Negative Negative      Imaging  No results found.    Cardiology, Vascular, and Other Imaging  No other imaging results found for the past 2 days      Diagnostic study results (if any) were reviewed by ENRIQUE Coronado.    Assessment/Plan   Allergies, medications, history, and pertinent labs/EKGs/Imaging reviewed by ENRIQUE Coronado.     Medical Decision Making  69-year-old male presents for sore throat on and off for 2 weeks.  He reports that sometimes when he drinks cold fluids his throat hurts.  On exam patient is no acute distress vital signs are stable heart rate is regular lungs are clear bilaterally.  Patient denies chest pain or dizziness denies headache.  Denies difficulty swallowing.  On exam throat has no erythema no edema patent airway.  There is no cervical lymphadenopathy.  Strep flu RSV and rhinovirus are all negative.  Patient is to monitor symptoms and try warm salt water gargles daily.  Patient needs to see ENT if symptoms persist for possible scope.  Patient can try over-the-counter medicine for acid reflux.  If symptoms  worsen patient needs to go to the emergency department.  Patient can also follow-up with GI.  Follow-up with your doctor 1 to 2 days patient agrees with plan of care patient for stable condition    Orders and Diagnoses  Diagnoses and all orders for this visit:  Sore throat  -     POCT SPOTFIRE R/ST Panel Mini w/Strep A ([facility]) manually resulted      Medical Admin Record      Patient disposition: Home    Electronically signed by ENRIQUE Coronado  7:52 PM           [1] (Not in a hospital admission)  [2]   Past Medical History:  Diagnosis Date    Cataract     COPD (chronic obstructive pulmonary disease) (Multi) 06/1/22    Other specified health status     No pertinent past medical history    Rheumatoid arthritis     Sinusitis    [3]   Past Surgical History:  Procedure Laterality Date    OTHER SURGICAL HISTORY  01/28/2021    Neck surgery    SPINE SURGERY  1/22

## 2025-04-24 ENCOUNTER — TELEPHONE (OUTPATIENT)
Dept: PRIMARY CARE | Facility: CLINIC | Age: 70
End: 2025-04-24

## 2025-04-24 ENCOUNTER — TELEMEDICINE (OUTPATIENT)
Dept: PRIMARY CARE | Facility: CLINIC | Age: 70
End: 2025-04-24
Payer: MEDICARE

## 2025-04-24 DIAGNOSIS — R13.10 DYSPHAGIA, UNSPECIFIED TYPE: Primary | ICD-10-CM

## 2025-04-24 PROCEDURE — 1160F RVW MEDS BY RX/DR IN RCRD: CPT | Performed by: NURSE PRACTITIONER

## 2025-04-24 PROCEDURE — 1036F TOBACCO NON-USER: CPT | Performed by: NURSE PRACTITIONER

## 2025-04-24 PROCEDURE — 1159F MED LIST DOCD IN RCRD: CPT | Performed by: NURSE PRACTITIONER

## 2025-04-24 PROCEDURE — 1123F ACP DISCUSS/DSCN MKR DOCD: CPT | Performed by: NURSE PRACTITIONER

## 2025-04-24 PROCEDURE — 99213 OFFICE O/P EST LOW 20 MIN: CPT | Performed by: NURSE PRACTITIONER

## 2025-04-24 ASSESSMENT — ENCOUNTER SYMPTOMS: SORE THROAT: 1

## 2025-04-24 NOTE — PROGRESS NOTES
Subjective   Patient ID: Felix Mancera is a 69 y.o. male who presents for Sore Throat.    Virtual or Telephone Consent    An interactive audio and video telecommunication system which permits real time communications between the patient (at the originating site) and provider (at the distant site) was utilized to provide this telehealth service.   Verbal consent was requested and obtained from Felix Mancera on this date, 04/24/25 for a telehealth visit and the patient's location was confirmed at the time of the visit.  Patient is located in [place]    Sore throat x 2 weeks - worse over the past few days.  Able to swallow but increased pain when swallowing.  Pain will make him cough.    Has COPD and does use inhalers daily - states he rinses after using them   He went to  yesterday and testing for strep and RSV negative, flu negative.    Negative for thrush  Was referred to ENT - but can't be seen for over a month  Negative for facial weakness, facial drooping, fever, slurred speech, fever, shortness of breath, or chest pain  Called PCP office as he wasn't sure of next steps and they told him he should go to ER if he can't wait for specialist appt   Has had GERD symptoms over the past 2 weeks as well and does not normally have GERD  Denies chest pain, diaphoresis, arm pain            Sore Throat          Review of Systems   HENT:  Positive for sore throat.    All other systems reviewed and are negative.      Objective   There were no vitals taken for this visit.    Physical Exam  Constitutional:       General: He is not in acute distress.     Appearance: Normal appearance. He is not ill-appearing.   HENT:      Head: Normocephalic and atraumatic.      Right Ear: External ear normal.      Left Ear: External ear normal.      Nose: Nose normal.   Eyes:      Conjunctiva/sclera: Conjunctivae normal.   Pulmonary:      Effort: Pulmonary effort is normal.   Neurological:      Mental Status: He is alert and  oriented to person, place, and time.   Psychiatric:         Mood and Affect: Mood normal.         Behavior: Behavior normal.         Assessment/Plan   Problem List Items Addressed This Visit           ICD-10-CM    Dysphagia - Primary R13.10    Reviewed UC notes and tests  Placed urgent referral to ENT  Placed urgent referral to GI  If symptoms worse go to ER  Red flags of when to seek immediate care discussed          Relevant Orders    Referral to ENT    Referral to Gastroenterology

## 2025-04-24 NOTE — TELEPHONE ENCOUNTER
Patient called and stated that he has went to the urgent care for some pain in his mouth. Says that urgent care didn't assist him and he would like a call to discuss the next steps.

## 2025-04-24 NOTE — ASSESSMENT & PLAN NOTE
Reviewed UC notes and tests  Placed urgent referral to ENT  Placed urgent referral to GI  If symptoms worse go to ER  Red flags of when to seek immediate care discussed

## 2025-04-25 ASSESSMENT — ENCOUNTER SYMPTOMS: SORE THROAT: 1

## 2025-04-28 ENCOUNTER — OFFICE VISIT (OUTPATIENT)
Dept: GASTROENTEROLOGY | Facility: CLINIC | Age: 70
End: 2025-04-28
Payer: MEDICARE

## 2025-04-28 VITALS
SYSTOLIC BLOOD PRESSURE: 137 MMHG | DIASTOLIC BLOOD PRESSURE: 83 MMHG | BODY MASS INDEX: 26.37 KG/M2 | HEART RATE: 101 BPM | WEIGHT: 174 LBS | HEIGHT: 68 IN | TEMPERATURE: 97.3 F

## 2025-04-28 DIAGNOSIS — K21.9 GASTROESOPHAGEAL REFLUX DISEASE, UNSPECIFIED WHETHER ESOPHAGITIS PRESENT: ICD-10-CM

## 2025-04-28 DIAGNOSIS — Z12.11 COLON CANCER SCREENING: Primary | ICD-10-CM

## 2025-04-28 DIAGNOSIS — R13.10 DYSPHAGIA, UNSPECIFIED TYPE: ICD-10-CM

## 2025-04-28 PROCEDURE — 1159F MED LIST DOCD IN RCRD: CPT | Performed by: STUDENT IN AN ORGANIZED HEALTH CARE EDUCATION/TRAINING PROGRAM

## 2025-04-28 PROCEDURE — 1123F ACP DISCUSS/DSCN MKR DOCD: CPT | Performed by: STUDENT IN AN ORGANIZED HEALTH CARE EDUCATION/TRAINING PROGRAM

## 2025-04-28 PROCEDURE — 3008F BODY MASS INDEX DOCD: CPT | Performed by: STUDENT IN AN ORGANIZED HEALTH CARE EDUCATION/TRAINING PROGRAM

## 2025-04-28 PROCEDURE — 99214 OFFICE O/P EST MOD 30 MIN: CPT | Performed by: STUDENT IN AN ORGANIZED HEALTH CARE EDUCATION/TRAINING PROGRAM

## 2025-04-28 PROCEDURE — 99204 OFFICE O/P NEW MOD 45 MIN: CPT | Performed by: STUDENT IN AN ORGANIZED HEALTH CARE EDUCATION/TRAINING PROGRAM

## 2025-04-28 RX ORDER — PANTOPRAZOLE SODIUM 40 MG/1
40 TABLET, DELAYED RELEASE ORAL
Qty: 30 TABLET | Refills: 0 | Status: SHIPPED | OUTPATIENT
Start: 2025-04-28 | End: 2025-05-28

## 2025-04-28 ASSESSMENT — ENCOUNTER SYMPTOMS
HEADACHES: 0
HOARSE VOICE: 1
ABDOMINAL PAIN: 0
SHORTNESS OF BREATH: 1
SORE THROAT: 1
TROUBLE SWALLOWING: 1
STRIDOR: 0
DIARRHEA: 0
COUGH: 1
NECK PAIN: 0
SWOLLEN GLANDS: 0

## 2025-04-28 NOTE — PATIENT INSTRUCTIONS
Thank you for seeing us in clinic today!     In summary, please do the following:  We will schedule you for your upper endoscopy.   2.   Please get your cologuard at your earliest convenience.   3. Please take pantoprazole one hour before breakfast daily.   4. Please keep your referral for ear nose and throat doctors.     We will see you again in 3 months or sooner if needed.   If you have any questions or concerns, please call the office at [phone].

## 2025-04-28 NOTE — PROGRESS NOTES
"REASON FOR VISIT:  Dysphagia    HPI:  Felix Mancera is a 69 y.o. male COPD, RA, HLD and HTN who presents to GI clinic for evaluation of dysphagia.      Patient was recently seen in urgent care for 4 week history of sore throat and pain with solid foods as well as liquids. He reported that he tried to eat a piece of bread and felt as though it had difficulties passing and had a coughing fit. He reported that he also had some heart burn, regurgitation and nausea but, denied emesis. His symptoms worsened and then he presented  to Urgent Care. POCT Viral panel was negative and patient was eventually referred to ENT as well as GI for evaluation. He has chronic cough without any changes in sputum appearance.  He also reported that he has raspy voice as baseline but, it has worsened during this last month.     Patient otherwise denied weight loss, melena, hematochezia, fever/chills, night sweats or any other symptoms.      Med list notable for Hydroycholorquine and Trellegy     Labs notable for CMP with CKD Stage 2 but, otherwise wnl, CBC wnl    No fhx of CRC or any other GI Cancers    Social Hx:   Tobacco- 25 years x ½ ppd, Quit   EOTH- 5 x per month wine   Rec Drug: Denies      Prev endoscopic eval: Cologuard neg     Allergies[1]    Medical History[2]    Surgical History[3]    Family History[4]    Social History     Tobacco Use    Smoking status: Former     Current packs/day: 0.00     Types: Cigarettes     Start date: 1974     Quit date: 2005     Years since quittin.0    Smokeless tobacco: Never    Tobacco comments:     Struggling always use gum patch or other nicotine deliveries when stressed   Substance Use Topics    Alcohol use: Yes     Comment: Less than weekly use if wine or mixed drinks       Current Medications[5]    PHYSICAL EXAM:  /83   Pulse 101   Temp 36.3 °C (97.3 °F)   Ht 1.727 m (5' 8\")   Wt 78.9 kg (174 lb)   BMI 26.46 kg/m²    Gen: AAOx3, NAD  Head: " Normocephalic, atraumatic  Eyes: Sclera anicteric, conjunctiva pink   Neck: Supple  Heart: RRR, no murmurs, rubs or gallops  Lungs: CTAB, non-labored breathing  Abd: Soft, non-distended, non-tender, bowel sounds present, no rebound or guarding, no organomegaly  Ext: No LE edema b/l  Neuro: no gross focal deficits  Psych: Congruent mood and affect, appropriate insight and judgement  Skin: No jaundice      This SmartLink has not been configured with any valid records.    cbc  Lab Results   Component Value Date    ALT 26 12/04/2024    AST 25 12/04/2024    ALKPHOS 37 12/04/2024    BILITOT 0.4 12/04/2024       === 09/17/24 ===    CT LUNG SCREENING LOW DOSE    - Impression -  1.  Few small bilateral noncalcified pulmonary nodules measuring up  to 4 mm, as described above. Continued screening with low-dose  noncontrast chest CT in 12 months (from current date) is recommended.  2. Moderate to severe diffuse emphysema.  3. Estimated coronary artery calcium score is 57* which correlates  with at least 35th percentile rank as compared to matched SALGADO-study  subjects([email or web address]://[email or web address]-nhlbi.org/Calcium/input.aspx).    LUNG RADS CATEGORY:  Lung Rad: Lung-RADS 2 (Benign Appearance or Indolent Behavior)    Recommendation: Continue annual screening with Low Dose Chest CT in  12 months, recommended as per American College of Radiology  Guidelines Lung-RADS Version 2022.        **The patient's CAC score was measured with an FDA-cleared AI tool  that correlates well with traditional methods. However, due to the  non-gated CT scan and new algorithm, AI-powered scores should not  replace traditional cardiovascular risk assessment. For further  assistance, refer to the [facility] Cardiovascular Prevention  Program via an [facility] referral to 'Cardiology Prevention Program.'    MACRO:  None    Signed by: Feng Perdomo 9/18/2024 7:26 AM  Dictation workstation:   [id number]      ASSESSMENT  Dysphagia   Pulm comorbidities as  above  Colon cancer screening    Dysphagia and odynophagia in the setting of tobacco history, age, vocal changes, and regurgitation c/f possible obstructive process, plan for EGD for further evaluation. Cannot r/o malignancy. Never had prior Miguel's screening. No prior colonoscopy, prefers to pursue cologuard instead of colonoscopy. Start ppi.     PLAN  --EGD with MAC given pulmonary comorbidities   --start pantoprazole daily  --keep appt for ENT for further evaluation of vocal changes  --Colonoscopy- Cologuard negative 2022, repeat this year ordered    --HCV- Negative 3/2014    Clayton Costa, PGY-3     Date: 4/28/2025  Time: 3:16 PM         [1] No Known Allergies  [2]   Past Medical History:  Diagnosis Date    Cataract     COPD (chronic obstructive pulmonary disease) (Multi) 06/1/22    Other specified health status     No pertinent past medical history    Rheumatoid arthritis     Sinusitis    [3]   Past Surgical History:  Procedure Laterality Date    OTHER SURGICAL HISTORY  01/28/2021    Neck surgery    SPINE SURGERY  1/22   [4]   Family History  Problem Relation Name Age of Onset    Other (AMD) Mother Shalonda     Hypertension Mother Shalonda    [5]   Current Outpatient Medications   Medication Sig Dispense Refill    albuterol 2.5 mg /3 mL (0.083 %) nebulizer solution Take 3 mL (2.5 mg) by nebulization every 4 hours if needed for wheezing. 270 mL 3    albuterol 90 mcg/actuation inhaler Inhale 2 puffs every 4 hours if needed for wheezing. 25.5 g 2    atorvastatin (Lipitor) 40 mg tablet Take 1 tablet (40 mg) by mouth once daily. 90 tablet 3    folic acid (Folvite) 1 mg tablet Take 1 tablet (1 mg) by mouth once daily.      hydroxychloroquine (Plaquenil) 200 mg tablet Take 1 tablet (200 mg) by mouth once daily. 90 tablet 3    ipratropium (Atrovent) 0.02 % nebulizer solution Take 2.5 mL (0.5 mg) by nebulization 4 times a day. 225 mL 3    ipratropium-albuteroL (Duo-Neb) 0.5-2.5 mg/3 mL nebulizer solution Take 3 mL by  nebulization 4 times a day. As needed      multivitamin with iron (DAILY MULTIPLE VITAMINS/IRON ORAL) Take 1 tablet by mouth once daily.      NON FORMULARY Patient with h/o advanced COPD, currently requiring supplemental oxygen,requires home nebulizzer for administration of bronchodilators ( Albuterol solution), for rescue therapy. I attest that this is a medically necessary treatment.      Trelegy Ellipta 200-62.5-25 mcg blister with device Inhale 1 puff once daily. 60 each 3    albuterol 90 mcg/actuation inhaler Inhale 2 puffs every 4 hours if needed for wheezing or shortness of breath. 18 g 3    pantoprazole (ProtoNix) 40 mg EC tablet Take 1 tablet (40 mg) by mouth once daily in the morning. Take before meals. Do not crush, chew, or split. 30 tablet 0     No current facility-administered medications for this visit.

## 2025-04-29 ENCOUNTER — OFFICE VISIT (OUTPATIENT)
Dept: OTOLARYNGOLOGY | Facility: CLINIC | Age: 70
End: 2025-04-29
Payer: MEDICARE

## 2025-04-29 VITALS — HEIGHT: 68 IN | TEMPERATURE: 96.4 F | BODY MASS INDEX: 26.89 KG/M2 | WEIGHT: 177.4 LBS

## 2025-04-29 DIAGNOSIS — J38.3 VOCAL FOLD ATROPHY: ICD-10-CM

## 2025-04-29 DIAGNOSIS — J41.8 MIXED SIMPLE AND MUCOPURULENT CHRONIC BRONCHITIS (MULTI): ICD-10-CM

## 2025-04-29 DIAGNOSIS — J38.1 REINKE'S EDEMA OF VOCAL FOLDS: ICD-10-CM

## 2025-04-29 DIAGNOSIS — R49.0 DYSPHONIA: ICD-10-CM

## 2025-04-29 DIAGNOSIS — R13.10 DYSPHAGIA, UNSPECIFIED TYPE: Primary | ICD-10-CM

## 2025-04-29 PROCEDURE — 1160F RVW MEDS BY RX/DR IN RCRD: CPT | Performed by: OTOLARYNGOLOGY

## 2025-04-29 PROCEDURE — 31575 DIAGNOSTIC LARYNGOSCOPY: CPT | Performed by: OTOLARYNGOLOGY

## 2025-04-29 PROCEDURE — 99214 OFFICE O/P EST MOD 30 MIN: CPT | Mod: 25 | Performed by: OTOLARYNGOLOGY

## 2025-04-29 PROCEDURE — 1123F ACP DISCUSS/DSCN MKR DOCD: CPT | Performed by: OTOLARYNGOLOGY

## 2025-04-29 PROCEDURE — 1125F AMNT PAIN NOTED PAIN PRSNT: CPT | Performed by: OTOLARYNGOLOGY

## 2025-04-29 PROCEDURE — 1036F TOBACCO NON-USER: CPT | Performed by: OTOLARYNGOLOGY

## 2025-04-29 PROCEDURE — 1159F MED LIST DOCD IN RCRD: CPT | Performed by: OTOLARYNGOLOGY

## 2025-04-29 PROCEDURE — 3008F BODY MASS INDEX DOCD: CPT | Performed by: OTOLARYNGOLOGY

## 2025-04-29 PROCEDURE — 99204 OFFICE O/P NEW MOD 45 MIN: CPT | Performed by: OTOLARYNGOLOGY

## 2025-04-29 ASSESSMENT — PAIN SCALES - GENERAL: PAINLEVEL_OUTOF10: 4

## 2025-04-29 NOTE — PROGRESS NOTES
Patient: Felix Mancera   MRN: [id number] YOB: 1955   Sex: male Age: 69 y.o.  Date of Service: 2025       ASSESSMENT AND PLAN  I discussed the findings with Felix Mancera and have recommended the followin. Dysphagia/odynophagia to solids and liquids, now improving. EGD planned with GI. We discussed MBS if his symptoms do not get better.  - Follow-up as needed    2. 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 We discussed voice therapy and procedural interventions. He would like to monitor      CHIEF COMPLAINT  Chief Complaint   Patient presents with    Dysphagia       HISTORY OF PRESENT ILLNESS  Felix Mancera is a 69 y.o. male referred for evaluation of dysphagia and dysphonia.  The patient COPD, RA, HLD and HTN.      25  He reports about 1 month ago, he swallowed something and had an hour long coughing fit. The rest of the week he had issues with coughing primarily in the morning, worse than his usual COPD cough. He noted more throat pain too with swallowing as well as more regurgitation. Today he notes it is hard to talk and it feels uncomfortable eating. He eats very slow, hard to swallow though denies feeling like food and liquid getting stuck. Saw GI yesterday and planning for EGD.    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 Surgical History  He has a past surgical history that includes Other  "surgical history (01/28/2021) and Spine surgery (1/22).   Social History  He reports that he quit smoking about 20 years ago. His smoking use included cigarettes. He started smoking about 51 years ago. He has never used smokeless tobacco. He reports current alcohol use. He reports that he does not currently use drugs after having used the following drugs: Hydrocodone, Marijuana, and Oxycodone. Allergies  Patient has no known allergies.     Family History  Family History[1]     REVIEW OF SYSTEMS  All 10 systems were reviewed and negative except for above.      PHYSICAL EXAM  ENT Physical Exam   GENERAL: Well-nourished and developed, alert and appropriate, no distress, voice [id number]  RESPIRATORY: Breathing quietly, no stridor  HEAD: Normocephalic atraumatic  FACE: Symmetric, no masses or lesions  EYES:  Pupils reactive, sclera clear, external ocular muscles intact, no nystagmus.    EARS:  Pinnae normal. External auditory canals clear and tympanic membranes intact.  NOSE:  No anterior lesions, masses or polyps.  ORAL CAVITY/OROPHARYNX:  Buccal mucosa is moist without lesions or masses, tongue midline and palate elevates symmetrically. Tongue mobility intact.  NECK:  Soft. There is no lymphadenopathy or thyromegaly.    NEUROLOGIC:  Cranial nerves II-XII grossly intact.       Last Recorded Vitals  Temperature 35.8 °C (96.4 °F), height 1.727 m (5' 8\"), weight 80.5 kg (177 lb 6.4 oz).    RESULTS    Patient Reported Outcome Measures  N/A    Laboratory, Radiology, and Pathology  I personally reviewed the following results, with the following interpretation:  N/A      PROCEDURES  Flexible Fiberoptic Laryngoscopy     Patient failed a mirror exam due to limitations of equipment and the need for laryngoscopy to assess laryngeal anatomy and function    PREOPERATIVE DIAGNOSIS: dysphagia, dysphonia    POSTOPERATIVE DIAGNOSIS: Same    PROCEDURE: Transnasal videolaryngoscopy    ANESTHESIA:  Topical    COMPLICATIONS:  " None    SPECIMENS:  None    PROCEDURE IN DETAIL:  The patient was brought into the endoscopy suite, placed in the upright position.  The nasal cavity was topically decongested anesthetized.  The distal chip video laryngoscope was passed through the nasal cavity.  The nasal cavity and nasopharynx were within normal limits except noted below. The following findings on laryngoscopy were noted:     Tongue Base: no masses or lesions   Vocal Fold Mobility               Right VF: mobile               Left VF: mobile   TVF Appearance               Edema/Erythema: bilateral Chucky's               Lesions/vibratory margin irregularities: severe atrophy   Muscle Tension Patterns:  lateral and AP   Other Findings: none    The patient tolerated the procedure well.          ----------------------------------------------------------------------  Violet Manrique MD, MAEd    [facility]  Department of Otolaryngology - Head and Neck Surgery  [facility]    The total time I spent in care of this patient today (excluding time spent on other billable services) is as follows:    Time Spent  Prep time on day of patient encounter: 10 minutes  Time spent directly with patient, family or caregiver: 20 minutes  Additional Time Spent on Patient Care Activities: 5 minutes  Documentation Time: 10 minutes  Other Time Spent: 0 minutes  Total: 45 minutes                [1]   Family History  Problem Relation Name Age of Onset    Other (AMD) Mother Shalonda     Hypertension Mother Shalonda

## 2025-05-03 ENCOUNTER — ANESTHESIA EVENT (OUTPATIENT)
Dept: GASTROENTEROLOGY | Facility: HOSPITAL | Age: 70
End: 2025-05-03
Payer: MEDICARE

## 2025-05-03 NOTE — ANESTHESIA PREPROCEDURE EVALUATION
Patient: Felix Mancera    Procedure Information       Date/Time: 05/05/25 1030    Scheduled providers: Hamzah Richmond MD    Procedure: EGD    Location: [facility]            Relevant Problems   Anesthesia (within normal limits)  No family hx MH      Cardiac   (+) HLD (hyperlipidemia)      Pulmonary   (+) COPD (chronic obstructive pulmonary disease) (Multi)   (+) Chronic bronchitis (Multi)   (+) Lung nodules      Neuro  Neck fusion   (+) Acute lumbar radiculopathy      GI (within normal limits)   (+) Dysphagia (Resolved)      /Renal (within normal limits)      Liver (within normal limits)      Endocrine (within normal limits)      Hematology (within normal limits)      Musculoskeletal   (+) Cervical spinal stenosis   (+) Polyarthritis   (+) Rheumatoid arthritis      HEENT  Hoarseness irregularities vocal cord      ID   (+) Tinea cruris      Skin (within normal limits)      GYN (within normal limits)     Dysphagia/odynophagia to solids and liquids, now improving. EGD planned with GI.  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    The patient COPD, RA, HLD and HTN.     Clinical information reviewed:                 Vitals:    05/05/25 1017   BP: 128/89   Pulse: 102   Resp: 22   Temp: 36.4 °C (97.5 °F)   SpO2: 95%       Surgical History[1]  Medical History[2]  Current Medications[3]  Prior to Admission medications    Medication Sig Start Date End Date Taking? Authorizing Provider   albuterol 2.5 mg /3 mL (0.083 %) nebulizer solution Take 3 mL (2.5 mg) by nebulization every 4 hours if needed for wheezing. 12/10/24  Yes Xiomara Guevara MD   albuterol 90 mcg/actuation inhaler Inhale 2 puffs every 4 hours if needed for wheezing. 3/13/24  Yes Terrence Farley MD   atorvastatin (Lipitor) 40 mg tablet Take 1 tablet (40 mg) by mouth once daily. 12/3/24 12/3/25 Yes Terrence Farley MD   folic acid (Folvite) 1 mg tablet Take 1 tablet (1 mg) by mouth once daily.  14  Yes Historical Provider, MD   hydroxychloroquine (Plaquenil) 200 mg tablet Take 1 tablet (200 mg) by mouth once daily. 25  Yes Terrence Farley MD   multivitamin with iron (DAILY MULTIPLE VITAMINS/IRON ORAL) Take 1 tablet by mouth once daily. 22  Yes Historical Provider, MD   NON FORMULARY Patient with h/o advanced COPD, currently requiring supplemental oxygen,requires home nebulizzer for administration of bronchodilators ( Albuterol solution), for rescue therapy. I attest that this is a medically necessary treatment. 22  Yes Historical Provider, MD   pantoprazole (ProtoNix) 40 mg EC tablet Take 1 tablet (40 mg) by mouth once daily in the morning. Take before meals. Do not crush, chew, or split. 25 Yes Rosmery Werner MD   Trelegy Ellipta 200-62.5-25 mcg blister with device Inhale 1 puff once daily. 10/9/24  Yes Terrence Farley MD   albuterol 90 mcg/actuation inhaler Inhale 2 puffs every 4 hours if needed for wheezing or shortness of breath. 3/11/24 3/11/25  Terrence Farley MD   ipratropium (Atrovent) 0.02 % nebulizer solution Take 2.5 mL (0.5 mg) by nebulization 4 times a day. 9/3/24   Xiomara Guevara MD   ipratropium-albuteroL (Duo-Neb) 0.5-2.5 mg/3 mL nebulizer solution Take 3 mL by nebulization 4 times a day. As needed  Patient not taking: Reported on 2025   Historical Provider, MD     RX Allergies[4]  Social History     Tobacco Use    Smoking status: Former     Current packs/day: 0.00     Types: Cigarettes     Start date: 1974     Quit date: 2005     Years since quittin.0    Smokeless tobacco: Never    Tobacco comments:     Struggling always use gum patch or other nicotine deliveries when stressed   Substance Use Topics    Alcohol use: Yes     Comment: Less than weekly use if wine or mixed drinks         Chemistry    Lab Results   Component Value Date/Time     2024 0900    K 5.1 2024 0900     2024 0900    CO2  "31 12/04/2024 0900    BUN 17 12/04/2024 0900    CREATININE 1.28 12/04/2024 0900    Lab Results   Component Value Date/Time    CALCIUM 10.2 12/04/2024 0900    ALKPHOS 37 12/04/2024 0900    AST 25 12/04/2024 0900    ALT 26 12/04/2024 0900    BILITOT 0.4 12/04/2024 0900          Lab Results   Component Value Date/Time    WBC 8.2 12/04/2024 0900    HGB 16.3 12/04/2024 0900    HCT 49.3 12/04/2024 0900     12/04/2024 0900     No results found for: \"PROTIME\", \"PTT\", \"INR\"  No results found for this or any previous visit (from the past 4464 hours).  No results found for this or any previous visit from the past 1095 days.     NPO Detail:  No data recorded     Physical Exam    Airway  Mallampati: II  TM distance: >3 FB  Mouth opening: 3 or more finger widths     Cardiovascular    Dental    Pulmonary    Abdominal            Anesthesia Plan    History of general anesthesia?: yes  History of complications of general anesthesia?: no    ASA 3     MAC     intravenous induction   Anesthetic plan and risks discussed with patient.  Use of blood products discussed with patient who consented to blood products.    Plan discussed with CAA and CRNA.           [1]   Past Surgical History:  Procedure Laterality Date    OTHER SURGICAL HISTORY  01/28/2021    Neck surgery    SPINE SURGERY  1/22   [2]   Past Medical History:  Diagnosis Date    Cataract     COPD (chronic obstructive pulmonary disease) (Multi) 06/1/22    Other specified health status     No pertinent past medical history    Rheumatoid arthritis     Sinusitis    [3]   Current Outpatient Medications:     albuterol 2.5 mg /3 mL (0.083 %) nebulizer solution, Take 3 mL (2.5 mg) by nebulization every 4 hours if needed for wheezing., Disp: 270 mL, Rfl: 3    albuterol 90 mcg/actuation inhaler, Inhale 2 puffs every 4 hours if needed for wheezing., Disp: 25.5 g, Rfl: 2    atorvastatin (Lipitor) 40 mg tablet, Take 1 tablet (40 mg) by mouth once daily., Disp: 90 tablet, Rfl: 3    folic " acid (Folvite) 1 mg tablet, Take 1 tablet (1 mg) by mouth once daily., Disp: , Rfl:     hydroxychloroquine (Plaquenil) 200 mg tablet, Take 1 tablet (200 mg) by mouth once daily., Disp: 90 tablet, Rfl: 3    multivitamin with iron (DAILY MULTIPLE VITAMINS/IRON ORAL), Take 1 tablet by mouth once daily., Disp: , Rfl:     NON FORMULARY, Patient with h/o advanced COPD, currently requiring supplemental oxygen,requires home nebulizzer for administration of bronchodilators ( Albuterol solution), for rescue therapy. I attest that this is a medically necessary treatment., Disp: , Rfl:     pantoprazole (ProtoNix) 40 mg EC tablet, Take 1 tablet (40 mg) by mouth once daily in the morning. Take before meals. Do not crush, chew, or split., Disp: 30 tablet, Rfl: 0    Trelegy Ellipta 200-62.5-25 mcg blister with device, Inhale 1 puff once daily., Disp: 60 each, Rfl: 3    albuterol 90 mcg/actuation inhaler, Inhale 2 puffs every 4 hours if needed for wheezing or shortness of breath., Disp: 18 g, Rfl: 3    ipratropium (Atrovent) 0.02 % nebulizer solution, Take 2.5 mL (0.5 mg) by nebulization 4 times a day., Disp: 225 mL, Rfl: 3    ipratropium-albuteroL (Duo-Neb) 0.5-2.5 mg/3 mL nebulizer solution, Take 3 mL by nebulization 4 times a day. As needed (Patient not taking: Reported on 5/5/2025), Disp: , Rfl:   [4] No Known Allergies

## 2025-05-05 ENCOUNTER — ANESTHESIA (OUTPATIENT)
Dept: GASTROENTEROLOGY | Facility: HOSPITAL | Age: 70
End: 2025-05-05
Payer: MEDICARE

## 2025-05-05 ENCOUNTER — HOSPITAL ENCOUNTER (OUTPATIENT)
Dept: GASTROENTEROLOGY | Facility: HOSPITAL | Age: 70
Discharge: HOME | End: 2025-05-05
Payer: MEDICARE

## 2025-05-05 VITALS
RESPIRATION RATE: 20 BRPM | BODY MASS INDEX: 25.76 KG/M2 | HEIGHT: 68 IN | DIASTOLIC BLOOD PRESSURE: 83 MMHG | TEMPERATURE: 97 F | SYSTOLIC BLOOD PRESSURE: 121 MMHG | OXYGEN SATURATION: 95 % | HEART RATE: 88 BPM | WEIGHT: 170 LBS

## 2025-05-05 DIAGNOSIS — R13.10 DYSPHAGIA, UNSPECIFIED TYPE: ICD-10-CM

## 2025-05-05 DIAGNOSIS — K21.9 GASTROESOPHAGEAL REFLUX DISEASE, UNSPECIFIED WHETHER ESOPHAGITIS PRESENT: ICD-10-CM

## 2025-05-05 DIAGNOSIS — K20.90 ESOPHAGITIS: Primary | ICD-10-CM

## 2025-05-05 PROCEDURE — 7100000009 HC PHASE TWO TIME - INITIAL BASE CHARGE

## 2025-05-05 PROCEDURE — 43235 EGD DIAGNOSTIC BRUSH WASH: CPT | Performed by: INTERNAL MEDICINE

## 2025-05-05 PROCEDURE — A43235 PR ESOPHAGOGASTRODUODENOSCOPY TRANSORAL DIAGNOSTIC

## 2025-05-05 PROCEDURE — A43235 PR ESOPHAGOGASTRODUODENOSCOPY TRANSORAL DIAGNOSTIC: Performed by: ANESTHESIOLOGY

## 2025-05-05 PROCEDURE — 7100000010 HC PHASE TWO TIME - EACH INCREMENTAL 1 MINUTE

## 2025-05-05 PROCEDURE — 3700000001 HC GENERAL ANESTHESIA TIME - INITIAL BASE CHARGE

## 2025-05-05 PROCEDURE — 3700000002 HC GENERAL ANESTHESIA TIME - EACH INCREMENTAL 1 MINUTE

## 2025-05-05 PROCEDURE — 2500000004 HC RX 250 GENERAL PHARMACY W/ HCPCS (ALT 636 FOR OP/ED): Mod: JW

## 2025-05-05 RX ORDER — LIDOCAINE HCL/PF 100 MG/5ML
SYRINGE (ML) INTRAVENOUS AS NEEDED
Status: DISCONTINUED | OUTPATIENT
Start: 2025-05-05 | End: 2025-05-05

## 2025-05-05 RX ORDER — PANTOPRAZOLE SODIUM 40 MG/1
40 TABLET, DELAYED RELEASE ORAL 2 TIMES DAILY
Qty: 60 TABLET | Refills: 1 | Status: SHIPPED | OUTPATIENT
Start: 2025-05-05

## 2025-05-05 RX ORDER — ACETAMINOPHEN 325 MG/1
650 TABLET ORAL EVERY 4 HOURS PRN
OUTPATIENT
Start: 2025-05-05

## 2025-05-05 RX ORDER — LIDOCAINE IN NACL,ISO-OSMOT/PF 30 MG/3 ML
0.1 SYRINGE (ML) INJECTION ONCE
OUTPATIENT
Start: 2025-05-05 | End: 2025-05-05

## 2025-05-05 RX ORDER — PROPOFOL 10 MG/ML
INJECTION, EMULSION INTRAVENOUS AS NEEDED
Status: DISCONTINUED | OUTPATIENT
Start: 2025-05-05 | End: 2025-05-05

## 2025-05-05 RX ORDER — ONDANSETRON HYDROCHLORIDE 2 MG/ML
4 INJECTION, SOLUTION INTRAVENOUS ONCE AS NEEDED
OUTPATIENT
Start: 2025-05-05

## 2025-05-05 RX ORDER — FENTANYL CITRATE 50 UG/ML
INJECTION, SOLUTION INTRAMUSCULAR; INTRAVENOUS AS NEEDED
Status: DISCONTINUED | OUTPATIENT
Start: 2025-05-05 | End: 2025-05-05

## 2025-05-05 RX ORDER — SODIUM CHLORIDE, SODIUM LACTATE, POTASSIUM CHLORIDE, CALCIUM CHLORIDE 600; 310; 30; 20 MG/100ML; MG/100ML; MG/100ML; MG/100ML
100 INJECTION, SOLUTION INTRAVENOUS CONTINUOUS
OUTPATIENT
Start: 2025-05-05 | End: 2025-05-06

## 2025-05-05 RX ADMIN — SODIUM CHLORIDE, SODIUM LACTATE, POTASSIUM CHLORIDE, AND CALCIUM CHLORIDE: 600; 310; 30; 20 INJECTION, SOLUTION INTRAVENOUS at 10:43

## 2025-05-05 RX ADMIN — PROPOFOL 20 MG: 10 INJECTION, EMULSION INTRAVENOUS at 10:45

## 2025-05-05 RX ADMIN — PROPOFOL 20 MG: 10 INJECTION, EMULSION INTRAVENOUS at 10:49

## 2025-05-05 RX ADMIN — PROPOFOL 20 MG: 10 INJECTION, EMULSION INTRAVENOUS at 10:47

## 2025-05-05 RX ADMIN — PROPOFOL 150 MCG/KG/MIN: 10 INJECTION, EMULSION INTRAVENOUS at 10:46

## 2025-05-05 RX ADMIN — LIDOCAINE HYDROCHLORIDE 60 MG: 20 INJECTION INTRAVENOUS at 10:45

## 2025-05-05 RX ADMIN — FENTANYL CITRATE 25 MCG: 50 INJECTION, SOLUTION INTRAMUSCULAR; INTRAVENOUS at 10:45

## 2025-05-05 ASSESSMENT — PAIN - FUNCTIONAL ASSESSMENT: PAIN_FUNCTIONAL_ASSESSMENT: 0-10

## 2025-05-05 ASSESSMENT — ENCOUNTER SYMPTOMS
ROS GI COMMENTS: REFLUX.
VOMITING: 0
ABDOMINAL PAIN: 0
UNEXPECTED WEIGHT CHANGE: 0
NAUSEA: 0

## 2025-05-05 ASSESSMENT — PAIN SCALES - GENERAL
PAINLEVEL_OUTOF10: 0 - NO PAIN

## 2025-05-05 ASSESSMENT — COLUMBIA-SUICIDE SEVERITY RATING SCALE - C-SSRS
6. HAVE YOU EVER DONE ANYTHING, STARTED TO DO ANYTHING, OR PREPARED TO DO ANYTHING TO END YOUR LIFE?: NO
1. IN THE PAST MONTH, HAVE YOU WISHED YOU WERE DEAD OR WISHED YOU COULD GO TO SLEEP AND NOT WAKE UP?: NO
2. HAVE YOU ACTUALLY HAD ANY THOUGHTS OF KILLING YOURSELF?: NO

## 2025-05-05 NOTE — ANESTHESIA POSTPROCEDURE EVALUATION
Patient: Felix Mancera    Procedure Summary       Date: 05/05/25 Room / Location: [facility]    Anesthesia Start: 1043 Anesthesia Stop: 1058    Procedure: EGD Diagnosis: Dysphagia, unspecified type    Scheduled Providers: Hamzah Richmond MD Responsible Provider: Hayley Harrell MD    Anesthesia Type: MAC ASA Status: 3            Anesthesia Type: MAC    Vitals Value Taken Time   /83 05/05/25 11:23   Temp 36.1 °C (97 °F) 05/05/25 10:57   Pulse 88 05/05/25 11:23   Resp 20 05/05/25 11:23   SpO2 95 % 05/05/25 11:23       Anesthesia Post Evaluation    Patient location during evaluation: PACU  Patient participation: complete - patient participated  Level of consciousness: awake  Pain management: adequate  Airway patency: patent  Cardiovascular status: acceptable  Respiratory status: acceptable  Hydration status: acceptable  Postoperative Nausea and Vomiting: none        There were no known notable events for this encounter.

## 2025-05-05 NOTE — H&P
"History Of Present Illness  Felix Mancera is a 69 y.o. male presenting for an EGD for dysphagia, odynophagia, and regurgitation.    The patient has a smoking history.  The patient is on pantoprazole 40mg every day.     Past Medical History  Medical History[1]  Surgical History  Surgical History[2]  Social History  He reports that he quit smoking about 20 years ago. His smoking use included cigarettes. He started smoking about 51 years ago. He has never used smokeless tobacco. He reports current alcohol use. He reports that he does not currently use drugs after having used the following drugs: Hydrocodone, Marijuana, and Oxycodone.    Family History  Family History[3]     Allergies  Allergies[4]  Review of Systems   Constitutional:  Negative for unexpected weight change.   Gastrointestinal:  Negative for abdominal pain, nausea and vomiting.        Reflux.   All other systems reviewed and are negative.    Physical Exam  Vitals reviewed.   Constitutional:       Appearance: Normal appearance.   Eyes:      Pupils: Pupils are equal, round, and reactive to light.   Cardiovascular:      Heart sounds: Normal heart sounds.   Pulmonary:      Breath sounds: Normal breath sounds.   Abdominal:      General: There is no distension.      Palpations: Abdomen is soft.      Tenderness: There is no abdominal tenderness. There is no guarding or rebound.   Neurological:      General: No focal deficit present.      Mental Status: He is alert and oriented to person, place, and time.   Psychiatric:         Mood and Affect: Mood normal.         Behavior: Behavior normal.       Last Recorded Vitals  Blood pressure 128/89, pulse 102, temperature 36.4 °C (97.5 °F), temperature source Temporal, resp. rate 22, height 1.727 m (5' 8\"), weight 77.1 kg (170 lb), SpO2 95%.    Assessment/Plan   Felix Mancera is a 69 y.o. male presenting for an EGD for dysphagia, odynophagia, and regurgitation.     Caity Freeman MD         [1]   Past " Medical History:  Diagnosis Date    Cataract     COPD (chronic obstructive pulmonary disease) (Multi) 06/1/22    Other specified health status     No pertinent past medical history    Rheumatoid arthritis     Sinusitis    [2]   Past Surgical History:  Procedure Laterality Date    OTHER SURGICAL HISTORY  01/28/2021    Neck surgery    SPINE SURGERY  1/22   [3]   Family History  Problem Relation Name Age of Onset    Other (AMD) Mother Shalonda     Hypertension Mother Shalonda    [4] No Known Allergies

## 2025-05-05 NOTE — DISCHARGE INSTRUCTIONS
During the first 24 hours after your procedure, you should:    - Resume normal diet, unless otherwise directed by your doctor.  - Resume your home medications, unless otherwise directed by your doctor.  - Refrain from driving or operative heavy machinery.  - Drink plenty of liquids.  - Avoid consuming alcohol.  - Avoid strenuous activity or heavy lifting.    After 24 hours, you can resume regular activity.    Call your doctor office immediately ([phone]) or come to the nearest emergency room if you experience:    - Abdominal tenderness  - Blood in your stool or vomit  - Difficulty urinating or passing stools  - Difficulty breathing  - Chest pain  - Fever

## 2025-05-06 ASSESSMENT — PAIN SCALES - GENERAL: PAINLEVEL_OUTOF10: 0 - NO PAIN

## 2025-05-13 DIAGNOSIS — J44.9 CHRONIC OBSTRUCTIVE PULMONARY DISEASE, UNSPECIFIED COPD TYPE (MULTI): ICD-10-CM

## 2025-05-13 LAB — NONINV COLON CA DNA+OCC BLD SCRN STL QL: NEGATIVE

## 2025-05-13 RX ORDER — BUDESONIDE 1 MG/2ML
1 INHALANT ORAL
Qty: 120 ML | Refills: 5 | Status: SHIPPED | OUTPATIENT
Start: 2025-05-13

## 2025-05-23 DIAGNOSIS — Z00.00 HEALTHCARE MAINTENANCE: ICD-10-CM

## 2025-05-23 RX ORDER — FLUTICASONE FUROATE, UMECLIDINIUM BROMIDE AND VILANTEROL TRIFENATATE 200; 62.5; 25 UG/1; UG/1; UG/1
1 POWDER RESPIRATORY (INHALATION)
Qty: 60 EACH | Refills: 3 | Status: SHIPPED | OUTPATIENT
Start: 2025-05-23

## 2025-05-23 RX ORDER — FLUTICASONE FUROATE, UMECLIDINIUM BROMIDE AND VILANTEROL TRIFENATATE 200; 62.5; 25 UG/1; UG/1; UG/1
1 POWDER RESPIRATORY (INHALATION)
Refills: 3 | OUTPATIENT
Start: 2025-05-23

## 2025-05-30 DIAGNOSIS — J44.9 CHRONIC OBSTRUCTIVE PULMONARY DISEASE, UNSPECIFIED COPD TYPE (MULTI): ICD-10-CM

## 2025-06-02 ENCOUNTER — HOSPITAL ENCOUNTER (OUTPATIENT)
Dept: RESPIRATORY THERAPY | Facility: CLINIC | Age: 70
Discharge: HOME | End: 2025-06-02
Payer: MEDICARE

## 2025-06-02 DIAGNOSIS — J44.9 CHRONIC OBSTRUCTIVE PULMONARY DISEASE, UNSPECIFIED COPD TYPE (MULTI): ICD-10-CM

## 2025-06-02 PROCEDURE — 94618 PULMONARY STRESS TESTING: CPT

## 2025-06-10 ENCOUNTER — TELEPHONE (OUTPATIENT)
Dept: PULMONOLOGY | Facility: HOSPITAL | Age: 70
End: 2025-06-10
Payer: MEDICARE

## 2025-06-13 ENCOUNTER — TELEPHONE (OUTPATIENT)
Dept: PULMONOLOGY | Facility: HOSPITAL | Age: 70
End: 2025-06-13
Payer: MEDICARE

## 2025-06-13 NOTE — TELEPHONE ENCOUNTER
Patient explained that he received a POC from [facility] but wants to return it because it is too loud and he need a quieter unit.  This nurse will contact [facility] to see what needs to be done to switch equipment or change DME.

## 2025-07-17 ENCOUNTER — ANESTHESIA EVENT (OUTPATIENT)
Dept: GASTROENTEROLOGY | Facility: HOSPITAL | Age: 70
End: 2025-07-17
Payer: MEDICARE

## 2025-07-17 NOTE — ANESTHESIA PREPROCEDURE EVALUATION
Patient: Felix Mancera    Procedure Information       Date/Time: 07/18/25 1140    Scheduled providers: Narciso Paz MD; Freeman Hughes RN    Procedure: EGD    Location: [facility]            Relevant Problems   Anesthesia (within normal limits)  No family hx MH      Cardiac   (+) HLD (hyperlipidemia)      Pulmonary   (+) COPD (chronic obstructive pulmonary disease) (Multi)   (+) Chronic bronchitis (Multi)   (+) Lung nodules      Neuro   (+) Acute lumbar radiculopathy      GI (within normal limits)      /Renal (within normal limits)      Liver (within normal limits)      Endocrine (within normal limits)      Hematology (within normal limits)      Musculoskeletal   (+) Cervical spinal stenosis   (+) Rheumatoid arthritis      HEENT (within normal limits)      ID   (+) Tinea cruris      Skin (within normal limits)      GYN (within normal limits)      69 y.o. male presenting for an EGD for dysphagia, odynophagia, and regurgitation.   Clinical information reviewed:                   NPO Detail:  No data recorded     Physical Exam    Airway  Mallampati: III  Mouth opening: 3 or more finger widths     Cardiovascular - normal exam   Dental   Comments: R upper loose     Pulmonary - normal exam   Abdominal            Anesthesia Plan    History of general anesthesia?: yes  History of complications of general anesthesia?: no    ASA 2     MAC     intravenous induction   Anesthetic plan and risks discussed with patient.  Use of blood products discussed with patient who consented to blood products.    Plan discussed with CAA.

## 2025-07-18 ENCOUNTER — HOSPITAL ENCOUNTER (OUTPATIENT)
Dept: GASTROENTEROLOGY | Facility: HOSPITAL | Age: 70
Discharge: HOME | End: 2025-07-18
Payer: MEDICARE

## 2025-07-18 ENCOUNTER — ANESTHESIA (OUTPATIENT)
Dept: GASTROENTEROLOGY | Facility: HOSPITAL | Age: 70
End: 2025-07-18
Payer: MEDICARE

## 2025-07-18 VITALS
HEART RATE: 70 BPM | OXYGEN SATURATION: 97 % | SYSTOLIC BLOOD PRESSURE: 130 MMHG | HEIGHT: 68 IN | TEMPERATURE: 97.3 F | BODY MASS INDEX: 25.76 KG/M2 | WEIGHT: 170 LBS | RESPIRATION RATE: 15 BRPM | DIASTOLIC BLOOD PRESSURE: 92 MMHG

## 2025-07-18 DIAGNOSIS — R13.10 DYSPHAGIA, UNSPECIFIED TYPE: ICD-10-CM

## 2025-07-18 DIAGNOSIS — K20.90 ESOPHAGITIS: ICD-10-CM

## 2025-07-18 PROCEDURE — 7100000009 HC PHASE TWO TIME - INITIAL BASE CHARGE

## 2025-07-18 PROCEDURE — 3700000002 HC GENERAL ANESTHESIA TIME - EACH INCREMENTAL 1 MINUTE

## 2025-07-18 PROCEDURE — 7100000010 HC PHASE TWO TIME - EACH INCREMENTAL 1 MINUTE

## 2025-07-18 PROCEDURE — 43235 EGD DIAGNOSTIC BRUSH WASH: CPT | Performed by: INTERNAL MEDICINE

## 2025-07-18 PROCEDURE — 2500000004 HC RX 250 GENERAL PHARMACY W/ HCPCS (ALT 636 FOR OP/ED)

## 2025-07-18 PROCEDURE — 3700000001 HC GENERAL ANESTHESIA TIME - INITIAL BASE CHARGE

## 2025-07-18 PROCEDURE — 2500000001 HC RX 250 WO HCPCS SELF ADMINISTERED DRUGS (ALT 637 FOR MEDICARE OP)

## 2025-07-18 RX ORDER — LIDOCAINE HYDROCHLORIDE 20 MG/ML
INJECTION, SOLUTION INFILTRATION; PERINEURAL AS NEEDED
Status: DISCONTINUED | OUTPATIENT
Start: 2025-07-18 | End: 2025-07-18

## 2025-07-18 RX ORDER — PROPOFOL 10 MG/ML
INJECTION, EMULSION INTRAVENOUS CONTINUOUS PRN
Status: DISCONTINUED | OUTPATIENT
Start: 2025-07-18 | End: 2025-07-18

## 2025-07-18 RX ORDER — MIDAZOLAM HYDROCHLORIDE 1 MG/ML
INJECTION, SOLUTION INTRAMUSCULAR; INTRAVENOUS CONTINUOUS PRN
Status: DISCONTINUED | OUTPATIENT
Start: 2025-07-18 | End: 2025-07-18

## 2025-07-18 RX ORDER — ALBUTEROL SULFATE 0.83 MG/ML
2.5 SOLUTION RESPIRATORY (INHALATION) ONCE AS NEEDED
OUTPATIENT
Start: 2025-07-18

## 2025-07-18 RX ORDER — ALBUTEROL SULFATE 90 UG/1
INHALANT RESPIRATORY (INHALATION) AS NEEDED
Status: DISCONTINUED | OUTPATIENT
Start: 2025-07-18 | End: 2025-07-18

## 2025-07-18 RX ORDER — ACETAMINOPHEN 325 MG/1
650 TABLET ORAL EVERY 4 HOURS PRN
OUTPATIENT
Start: 2025-07-18

## 2025-07-18 RX ORDER — SODIUM CHLORIDE, SODIUM LACTATE, POTASSIUM CHLORIDE, CALCIUM CHLORIDE 600; 310; 30; 20 MG/100ML; MG/100ML; MG/100ML; MG/100ML
100 INJECTION, SOLUTION INTRAVENOUS CONTINUOUS
OUTPATIENT
Start: 2025-07-18 | End: 2025-07-19

## 2025-07-18 RX ORDER — LIDOCAINE IN NACL,ISO-OSMOT/PF 30 MG/3 ML
0.1 SYRINGE (ML) INJECTION ONCE
OUTPATIENT
Start: 2025-07-18 | End: 2025-07-18

## 2025-07-18 RX ORDER — ONDANSETRON HYDROCHLORIDE 2 MG/ML
4 INJECTION, SOLUTION INTRAVENOUS ONCE AS NEEDED
OUTPATIENT
Start: 2025-07-18

## 2025-07-18 RX ADMIN — PROPOFOL 70 MG: 10 INJECTION, EMULSION INTRAVENOUS at 11:45

## 2025-07-18 RX ADMIN — SODIUM CHLORIDE, SODIUM LACTATE, POTASSIUM CHLORIDE, AND CALCIUM CHLORIDE: 600; 310; 30; 20 INJECTION, SOLUTION INTRAVENOUS at 11:44

## 2025-07-18 RX ADMIN — PROPOFOL 170 MCG/KG/MIN: 10 INJECTION, EMULSION INTRAVENOUS at 11:44

## 2025-07-18 RX ADMIN — LIDOCAINE HYDROCHLORIDE 100 MG: 20 INJECTION, SOLUTION INFILTRATION; PERINEURAL at 11:44

## 2025-07-18 RX ADMIN — ALBUTEROL SULFATE 44 PUFF: 90 AEROSOL, METERED RESPIRATORY (INHALATION) at 11:36

## 2025-07-18 ASSESSMENT — PAIN - FUNCTIONAL ASSESSMENT
PAIN_FUNCTIONAL_ASSESSMENT: 0-10

## 2025-07-18 ASSESSMENT — COLUMBIA-SUICIDE SEVERITY RATING SCALE - C-SSRS
1. IN THE PAST MONTH, HAVE YOU WISHED YOU WERE DEAD OR WISHED YOU COULD GO TO SLEEP AND NOT WAKE UP?: NO
2. HAVE YOU ACTUALLY HAD ANY THOUGHTS OF KILLING YOURSELF?: NO
6. HAVE YOU EVER DONE ANYTHING, STARTED TO DO ANYTHING, OR PREPARED TO DO ANYTHING TO END YOUR LIFE?: NO

## 2025-07-18 ASSESSMENT — PAIN SCALES - GENERAL
PAINLEVEL_OUTOF10: 0 - NO PAIN

## 2025-07-18 NOTE — DISCHARGE INSTRUCTIONS

## 2025-07-18 NOTE — H&P
"History Of Present Illness  Felix Mancera is a 70 y.o. male with h/o dysphagia and regurgitation who had an EGD (5/2025) showing LA grade C esophagitis. He returns today for follow up EGD.  Has been taking his PPI BID and his symptoms have since resolved.     Past Medical History  Medical History[1]  Surgical History  Surgical History[2]  Social History  He reports that he quit smoking about 20 years ago. His smoking use included cigarettes. He started smoking about 51 years ago. He has a 7.5 pack-year smoking history. He has never used smokeless tobacco. He reports current alcohol use. He reports that he does not currently use drugs after having used the following drugs: Hydrocodone, Marijuana, and Oxycodone.    Family History  Family History[3]     Allergies  Allergies[4]  Review of Systems   All other systems reviewed and are negative.       Physical Exam    Eyes:      General: No scleral icterus.      Cardiovascular:      Rate and Rhythm: Normal rate and regular rhythm.      Pulses: Normal pulses.   Pulmonary:      Effort: Pulmonary effort is normal.   Abdominal:      General: Abdomen is flat. There is no distension.      Tenderness: There is no abdominal tenderness.     Neurological:      General: No focal deficit present.      Mental Status: He is alert and oriented to person, place, and time.     Psychiatric:         Mood and Affect: Mood normal.          Last Recorded Vitals  Blood pressure 120/75, pulse 76, temperature 36.6 °C (97.9 °F), temperature source Temporal, resp. rate 17, height 1.727 m (5' 8\"), weight 77.1 kg (170 lb), SpO2 97%.    Assessment/Plan   Will proceed with EGD      Misael Clay MD         [1]   Past Medical History:  Diagnosis Date    Cataract     COPD (chronic obstructive pulmonary disease) (Multi) 06/1/22    Other specified health status     No pertinent past medical history    Rheumatoid arthritis     Sinusitis    [2]   Past Surgical History:  Procedure Laterality Date    " OTHER SURGICAL HISTORY  01/28/2021    Neck surgery    SPINE SURGERY  1/22   [3]   Family History  Problem Relation Name Age of Onset    Other (AMD) Mother Shalonda     Hypertension Mother Shalonda    [4] No Known Allergies

## 2025-07-18 NOTE — ANESTHESIA POSTPROCEDURE EVALUATION
Patient: Felix Mancera    Procedure Summary       Date: 07/18/25 Room / Location: [facility]    Anesthesia Start: 1136 Anesthesia Stop: 1204    Procedure: EGD Diagnosis:       Dysphagia, unspecified type      Esophagitis    Scheduled Providers: Narciso Paz MD; Freeman Hughes RN; Hayley Harrell MD Responsible Provider: Hayley Harrell MD    Anesthesia Type: MAC ASA Status: 2            Anesthesia Type: MAC    Vitals Value Taken Time   /84 07/18/25 11:55   Temp 36.3 °C (97.3 °F) 07/18/25 11:55   Pulse 72 07/18/25 11:55   Resp 10 07/18/25 11:55   SpO2 100 % 07/18/25 11:55       Anesthesia Post Evaluation    Patient location during evaluation: PACU  Patient participation: complete - patient participated  Level of consciousness: awake  Pain management: adequate  Airway patency: patent  Cardiovascular status: acceptable  Respiratory status: acceptable  Hydration status: acceptable  Postoperative Nausea and Vomiting: none        No notable events documented.

## 2025-08-05 ENCOUNTER — TELEMEDICINE (OUTPATIENT)
Dept: PULMONOLOGY | Facility: CLINIC | Age: 70
End: 2025-08-05
Payer: MEDICARE

## 2025-08-05 DIAGNOSIS — J43.2 CENTRILOBULAR EMPHYSEMA (MULTI): Primary | ICD-10-CM

## 2025-08-05 DIAGNOSIS — Z87.891 HISTORY OF NICOTINE DEPENDENCE: Primary | ICD-10-CM

## 2025-08-05 DIAGNOSIS — J44.9 CHRONIC OBSTRUCTIVE PULMONARY DISEASE, UNSPECIFIED COPD TYPE (MULTI): ICD-10-CM

## 2025-08-05 PROCEDURE — 99213 OFFICE O/P EST LOW 20 MIN: CPT | Performed by: STUDENT IN AN ORGANIZED HEALTH CARE EDUCATION/TRAINING PROGRAM

## 2025-08-05 ASSESSMENT — ENCOUNTER SYMPTOMS
GASTROINTESTINAL NEGATIVE: 1
CONSTITUTIONAL NEGATIVE: 1
ENDOCRINE NEGATIVE: 1
EYES NEGATIVE: 1
CARDIOVASCULAR NEGATIVE: 1

## 2025-08-05 NOTE — PROGRESS NOTES
Department of Medicine I Division of Pulmonary, Critical Care, and Sleep Medicine   [id number] [place] [address]  Phone: [phone]  Fax: [phone]    History of Present Illness   Felix Mancera is a 70 y.o. male presenting for follow up of COPD.      8/5/2025   A/V per patient request   Since the last visit   Completed 36 sessions through [facility] rehab--continuing month to month activities   Doing 4 times a week 20 min exercises   Got [place] of breath [place] entrance--about a quarter mile.      Last walk test 283 m     MMRC 3   12/18/2024   Virtual visit --A/V   Is enrolled in [facility]---2 days a week for 36 sessions   Has previously completed pulmonary rehab   Pulmonary medications: trelegy, albuterol prn   Testing since last time was largely normal--no evidenice of immunodeficiency, normal abg (no hypercapnia), echo showed EF of 70%, RV was normal   Most recent eosinophil count was 400     Still has dyspnea with activity   Has not had any bronchitis episode since the last visit--also was the last time he went to [place] (son's home likely had mold --multiple family members with respiratory symptoms)       9/18/2024   Since the last visit   Did go to urgent care since last time--got abx and steroids     Having more exertional symptoms  Denies any orthopnea per say but does sleep propped up due to pain issues. Denies pnd.  Denies any swelling in the legs   Did gain 20lbs     Pulmonary medications:  trelegy, albuterol       4/18/2024 (virtual visit--phone call)   Been doing well since the last visit   Went to [place] and had symptoms when he came back --went to urgent care got prednisone and azithromycin   Did do pulmonary rehab   Family is planning to move back to [place]   A1AT level is normal       10/2023  Pulmonology Questionnaire (Submitted on 10/11/2023)  Chief Complaint: Primary symptoms  Do you experience frequent throat clearing?:  Yes  Chronicity: chronic  When did you first notice your symptoms?: more than 1 year ago  How often do your symptoms occur?: intermittently  Since you first noticed this problem, how has it changed?: gradually improving  Which of the following makes your symptoms worse?: climbing stairs, strenuous activity, URI  Which of the following makes your symptoms better?: ipratropium    Has issues when he goes to his son's house in [place], [place]   Started pulmonary rehab a few months ago  Triggers: humid air,   Pulmonary medications: trelegy, duoneb (once a week)    Last visit June 2023     Since last visit on 5/4/23 with Dr. Jung, patient unfortunately had a repeat hospital admission for COPD exacerbation from 6/19 through 6/24/2023. He had presented with significant shortness of breath for 3 days and required BiPAP in the ED; briefly on Precedex and admitted to the MICU. He was transitioned to supplemental O2 and improved with his ABGs. There was low concern for bacterial pneumonia. Was eventually transitioned down to 2 L nasal cannula and transferred to the floor. Was continued on a steroid course extended to 7 days. Was referred to pulmonary rehab (Per chart review, patient had refused pulmonary rehab previously). Was previously advised Roflumilast as part of a study (but was told it wouldn't’t be paid for and he didn't want to start paying for it himself). He never ended up starting on it. States he will be starting up pulmonary rehab soon. He has oxygen at home; which was originally given to him for some mild nocturnal hypoxia. Tried it for a couple months; stopped using it. He checks his SpO2 at home periodically at rest as well as after exertion. Can drop as low as 88-90%. Not putting oxygen on during the day. Using Trelegy 100 daily. Was hardly using his albuterol prior to going into the hospital. After coming home after this recent exacerbation, has only needed it about 1-2x. He has a chronic cough - worse in  the AM. Mildly productive with white/clear sputum. Admits to fairly frequent wheezing. RAY is fairly significant - he can feel winded moving around his house. Denies SOB at rest. Has intermittent runny nose - takes OTC Mucinex (I advised switching to Cetirizine). Denies GERD. Denies Chest pain, palpitations, lower leg edema.  CAT Today: 17        Last visit 1/2023: started trelegy       #COPD  No alpha 1 antitrypsin testing done since his last visit, reminded patient today.   Mr. Mancera has started using trelegy and has no complaints with it. He has started working back at the [employer] with some work modifications and is very happy to be back working. He has only had to stop occasionally and otherwise modifies his work for his shoulder pain.   He had a cold 1 month ago but did not require any steroids or physician evaluation. He did use his albuterol a little during that time.   He does have a cough in the AM and seldom wheezes. He cites living in an old [place] house as part of the triggers for his symptoms.   He is not interested in pulmonary rehab.     #Nocturnal hypoxia  He was prescribed nocturnal oxygen but hates it and stopped using i. He reportst that he sleeps well, with no snoring, or daytime fatigue.   He is not interested in a sleep medicine referral.       Past Medical History: COPD, RA, HLD, tobacco use  Surgical History: C spine  Social History: former smoker 35 pack years, quit 2012, xiomara,   Family History: father - COPD, emphysema, grandfather - emphysema    6MWT 326 m, 63%  PFT 1/5/23: FVC 91%, FEV 34%, ratio 28, %, gas trapping present, DLCO 50  Review of Systems  Review of Systems   Constitutional: Negative.    HENT: Negative.     Eyes: Negative.    Respiratory:          As per hpi   Cardiovascular: Negative.    Gastrointestinal: Negative.    Endocrine: Negative.    Genitourinary: Negative.      All other review of systems are negative and/or non-contributory.    Past  Medical History   He has a past medical history of Cataract, COPD (chronic obstructive pulmonary disease) (Multi) (06/1/22), Other specified health status, Rheumatoid arthritis, and Sinusitis.    Immunizations     Immunization History   Administered Date(s) Administered    COVID-19, mRNA, LNP-S, PF, 30 mcg/0.3 mL dose 10/30/2021    Flu vaccine, quadrivalent, high-dose, preservative free, age 65y+ (FLUZONE) 10/13/2022, 09/07/2024    Influenza, Seasonal, Quadrivalent, Adjuvanted 10/10/2023    Moderna COVID-19 vaccine, 12 years and older (50mcg/0.5mL)(Spikevax) 09/07/2024    Moderna COVID-19 vaccine, bivalent, blue cap/gray label *Check age/dose* 10/17/2022    Moderna SARS-CoV-2 Vaccination 03/06/2021, 04/20/2022    Pfizer COVID-19 vaccine, 12 years and older, (30mcg/0.3mL) (Comirnaty) 10/10/2023    Pneumococcal conjugate vaccine, 20-valent (PREVNAR 20) 06/08/2022    Pneumococcal polysaccharide vaccine, 23-valent, age 2 years and older (PNEUMOVAX 23) 08/03/2020, 01/05/2023    RESPIRATORY SYNCYTIAL VIRUS (RSV), ELIGIBLE PREGNANT PTS, 0.5 ML (ABRYSVO) 01/18/2024    Tdap vaccine, age 7 year and older (BOOSTRIX, ADACEL) 05/28/2011, 01/26/2022    Zoster vaccine, recombinant, adult (SHINGRIX) 10/01/2018       Medications and Allergies     Current Outpatient Medications   Medication Instructions    albuterol 90 mcg/actuation inhaler 2 puffs, inhalation, Every 4 hours PRN    albuterol 2.5 mg, nebulization, Every 4 hours PRN    atorvastatin (LIPITOR) 40 mg, oral, Daily    budesonide (PULMICORT) 1 mg, nebulization, 2 times daily RT, Rinse mouth with water after use to reduce aftertaste and incidence of candidiasis. Do not swallow.    folic acid (Folvite) 1 mg tablet 1 tablet, Daily    hydroxychloroquine (PLAQUENIL) 200 mg, oral, Daily    ipratropium (ATROVENT) 0.5 mg, nebulization, 4 times daily    ipratropium-albuteroL (Duo-Neb) 0.5-2.5 mg/3 mL nebulizer solution 3 mL, 4 times daily RT    multivitamin with iron (DAILY MULTIPLE  "VITAMINS/IRON ORAL) 1 tablet, Daily    NON FORMULARY Patient with h/o advanced COPD, currently requiring supplemental oxygen,requires home nebulizzer for administration of bronchodilators ( Albuterol solution), for rescue therapy. I attest that this is a medically necessary treatment.    pantoprazole (PROTONIX) 40 mg, oral, 2 times daily, Do not crush, chew, or split.    Trelegy Ellipta 200-62.5-25 mcg blister with device 1 puff, inhalation, Daily RT      Patient has no known allergies.    Social History   He reports that he quit smoking about 20 years ago. His smoking use included cigarettes. He started smoking about 51 years ago. He has a 7.5 pack-year smoking history. He has never used smokeless tobacco. He reports current alcohol use. He reports that he does not currently use drugs after having used the following drugs: Hydrocodone, Marijuana, and Oxycodone.    Smoking History: see HPI   Exposure/Job History: see HPI     Family History     Family History   Problem Relation Name Age of Onset    Other (AMD) Mother Shalonda     Hypertension Mother Shalonda            Surgical History   He has a past surgical history that includes Other surgical history (01/28/2021) and Spine surgery (1/22).  Cervical spine surgery    Physical Exam   There were no vitals taken for this visit.     Virtual visit  In no acute distress      Results   Pulmonary Function Tests:      No results found for: \"FEV1\", \"UWH6FDSD\"    Chest Radiograph:  XR chest 1 view 06/20/2023    Narrative  Interpreted By:  LIYAH PINO MD and GILDARDO WAY DO  MRN: [id number]  Patient Name: KIMBER TURK    STUDY:  CHEST 1 VIEW;  6/20/2023 11:05 am    INDICATION:  dyspnea, oxygen requirement .    COMPARISON:  Chest radiograph 06/19/2023 CT chest 08/12/2022.    ACCESSION NUMBER(S):  [id number]    ORDERING CLINICIAN:  CHAGO JAMESON    FINDINGS:  AP radiograph of the chest was provided.    CARDIOMEDIASTINAL SILHOUETTE:  Cardiomediastinal silhouette is normal in " size and configuration.    LUNGS:  Redemonstration of hyperinflated lungs with diffuse coarsened  interstitial markings, similar to prior. Bibasilar streaky opacities  favored to represent atelectasis. No new focal consolidation, pleural  effusion, pneumothorax.    ABDOMEN:  No remarkable upper abdominal findings.    BONES:  Remote rib fractures are again seen. Otherwise, no acute osseous  pathology.    Impression  Mild bibasilar atelectasis superimposed on chronic lung changes.  Otherwise, no new focal consolidation, pleural effusion, or  pneumothorax.    I personally reviewed the images/study and I agree with the findings  as stated above by resident physician, Dr. Daniel Schreiber. The  study was interpreted at [facility] in [facility].      Chest CT Scan:  CT lung screening low dose 09/01/2023    Narrative  Interpreted By:  ENRICO GROSSMAN MD  MRN: [id number]  Patient Name: KIMBER TURK    STUDY:  TH CT CHEST LOW DOSE FOR LUNG SCREENING WO CONTRAST;  9/1/2023 3:23 pm    INDICATION:  cough, dyspnea on exertion  R91.8: Lung nodules.    COMPARISON:  08/12/2022.    ACCESSION NUMBER(S):  [id number]    ORDERING CLINICIAN:  SARITHA VALDES    TECHNIQUE:  Helical data acquisition of the chest was obtained without IV  contrast material.  Images were reformatted in axial, coronal, and  sagittal planes.    FINDINGS:  LUNGS AND AIRWAYS:  The trachea and central airways are patent. No endobronchial lesion  is seen.    There is severe emphysematous changes. There is diffuse peribronchial  thickening.There is no focal consolidation, pleural effusion, or  pneumothorax.    There is a 3 mm right lower lobe parenchymal lung nodule unchanged.  There are no suspicious parenchymal lung nodules.    MEDIASTINUM AND ZACH, LOWER NECK AND AXILLA:  The visualized thyroid gland is within normal limits.  Scattered mediastinal lymph nodes are felt to be reactive in nature  Esophagus appears within normal  limits as seen.    HEART AND VESSELS:  There is mild atherosclerotic changes of the thoracic aorta.  Main pulmonary artery and its branches are normal in caliber.  There is mild coronary artery calcifications.  The cardiac chambers are not enlarged.  There is no pericardial effusion seen.    UPPER ABDOMEN:  The visualized subdiaphragmatic structures demonstrate no remarkable  findings.        CHEST WALL AND OSSEOUS STRUCTURES:  Chest wall is within normal limits.  No acute osseous pathology.There are no suspicious osseous lesions.    Impression  1. There are no suspicious parenchymal lung nodules. Recommend  continued 1 year low-dose chest CT for surveillance.        LUNG RADS CATEGORY:  Lung-RADS 2 (Benign Appearance or Indolent behavior): Continue with  annual screening in 12 months,  CT Chest Lung Ca Screen Initial  or Follow up LR1/LR2/Continuation of Screening Low Dose recommended  as per American College of Radiology Guidelines Lung-RADS Version  2022.       CT Chest 9/18/2024   IMPRESSION:  1. Few small bilateral noncalcified pulmonary nodules measuring up  to 4 mm, as described above. Continued screening with low-dose  noncontrast chest CT in 12 months (from current date) is recommended.  2. Moderate to severe diffuse emphysema.  3. Estimated coronary artery calcium score is 57* which correlates  with at least 35th percentile rank as compared to matched SALGADO-study  subjects(https://[email or web address]-nhlbi.org/Calcium/input.aspx).    LUNG RADS CATEGORY:  Lung Rad: Lung-RADS 2 (Benign Appearance or Indolent Behavior)    ECHO:  Echocardiogram     [facility], [address] [place] [address]  Tel [phone] and Fax [phone]    TRANSTHORACIC ECHOCARDIOGRAM REPORT      Patient Name:     KIMBER Godoy Physician:  [id number] GALLEGOS  Study Date:       12/12/2022          Referring           SARITHA VALDES  Physician:  MRN/PID:          [id number]             PCP:                Terrence Farley MD  Accession/Order#: [id number]        Department          [facility]  Location:           Invasive  YOB: 1955            Fellow:  Gender:           M                   Nurse:  Admit Date:                           Sonographer:        Graciela Willson [facility]  Admission Status: Outpatient          Additional Staff:  Height:           172.72 cm           CC Report to:  Weight:           72.57 kg            Study Type:         Echocardiogram  BSA:              1.86 m2  Blood Pressure: 115 /77 mmHg    Diagnosis/ICD: R06.00-Dyspnea, unspecified  Indication:    Chronic dyspnea; COPD; RAY  Procedure/CPT: Echo Complete w Full [id number]    Patient History:  Smoker:            Former.  Pertinent History: Dyspnea and COPD.    Study Detail: The following Echo studies were performed: 2D, M-Mode, Doppler and  color flow. Technically challenging study due to prominent lung  artifact.      PHYSICIAN INTERPRETATION:  Left Ventricle: The left ventricular systolic function is normal. There are no regional wall motion abnormalities. The left ventricular cavity size is normal. Left ventricular diastolic filling was indeterminate.  Left Atrium: The left atrium was not well visualized.  Right Ventricle: The right ventricle is normal in size. There is normal right ventricular global systolic function.  Right Atrium: The right atrium is normal in size.  Aortic Valve: The aortic valve was not well visualized. There is indeterminate aortic valve regurgitation. The peak instantaneous gradient of the aortic valve is 5.3 mmHg.  Mitral Valve: The mitral valve is normal in structure. There is trace mitral valve regurgitation.  Tricuspid Valve: The tricuspid valve was not well visualized. There is trace tricuspid regurgitation. The right ventricular systolic pressure is unable to be estimated.  Pulmonic Valve: The pulmonic valve is structurally normal. There is no indication of pulmonic  valve regurgitation.  Pericardium: There is no pericardial effusion noted.  Aorta: The aortic root was not well visualized.  Systemic Veins: The inferior vena cava size appears small.  In comparison to the previous echocardiogram(s): There are no prior studies on this patient for comparison purposes.      CONCLUSIONS:  1. Left ventricular systolic function is normal.  2. Poorly visualized anatomical structures due to suboptimal image quality.    QUANTITATIVE DATA SUMMARY:  LA VOLUME:  Normal Ranges:  LA Vol A4C:        17.2 ml    (22+/-6mL/m2)  LA Vol A2C:        21.1 ml  LA Vol BP:         19.5 ml  LA Vol Index A4C:  9.3 ml/m2  LA Vol Index A2C:  11.3 ml/m2  LA Vol Index BP:   10.5 ml/m2  LA Area A4C:       9.0 cm2  LA Area A2C:       10.2 cm2  LA Major Axis A4C: 4.0 cm  LA Major Axis A2C: 4.2 cm  LA Volume Index:   10.5 ml/m2  LA Vol A4C:        15.3 ml  LA Vol A2C:        20.3 ml    M-MODE MEASUREMENTS:  Normal Ranges:  Ao Root: 3.10 cm (2.0-3.7cm)    LV SYSTOLIC FUNCTION BY 2D PLANIMETRY (MOD):  Normal Ranges:  EF-A4C View: 58.0 % (>=55%)  EF-A2C View: 62.6 %  EF-Biplane:  60.0 %    LV DIASTOLIC FUNCTION:  Normal Ranges:  MV Peak E:        0.78 m/s    (0.7-1.2 m/s)  MV Peak A:        0.72 m/s    (0.42-0.7 m/s)  E/A Ratio:        1.08        (1.0-2.2)  MV lateral e'     0.07 m/s  MV medial e'      0.06 m/s  MV A Dur:         106.11 msec  PulmV Sys Ze:    53.95 cm/s  PulmV Mckeon Ze:   37.44 cm/s  PulmV S/D Ze:    1.44  PulmV A Revs Ze: 30.86 cm/s  PulmV A Revs Dur: 85.35 msec    MITRAL VALVE:  Normal Ranges:  MV Vmax:    0.91 m/s (<=1.3m/s)  MV peak PG: 3.3 mmHg (<5mmHg)  MV mean P.5 mmHg (<2mmHg)  MV VTI:     18.81 cm (10-13cm)  MV DT:      215 msec (150-240msec)    AORTIC VALVE:  Normal Ranges:  AoV Vmax:      1.15 m/s (<=1.7m/s)  AoV Peak P.3 mmHg (<20mmHg)  LVOT Max Ze:  0.93 m/s (<=1.1m/s)  LVOT VTI:      17.31 cm  LVOT Diameter: 1.96 cm  (1.8-2.4cm)  AoV Area,Vmax: 2.44 cm2  "(2.5-4.5cm2)    RIGHT VENTRICLE:  RV 1   2.7 cm  RV 2   2.2 cm  RV 3   6.0 cm  TAPSE: 21.0 mm    TRICUSPID VALVE/RVSP:  Normal Ranges:  Est. RA Pressure: 3 mmHg    PULMONIC VALVE:  Normal Ranges:  PV Accel Time: 104 msec (>120ms)  PV Max Ze:    1.0 m/s  (0.6-0.9m/s)  PV Max P.0 mmHg    Pulmonary Veins:  PulmV A Revs Dur: 85.35 msec  PulmV A Revs Ze: 30.86 cm/s  PulmV Mckeon Ze:   37.44 cm/s  PulmV S/D Ze:    1.44  PulmV Sys Ze:    53.95 cm/s      [id number] Bienvenido Valero DO  Electronically signed on 2022 at 5:25:10 PM        Final        Labs:  Lab Results   Component Value Date    WBC 8.2 2024    HGB 16.3 2024    HCT 49.3 2024    MCV 91 2024     2024       Lab Results   Component Value Date    CREATININE 1.28 2024    BUN 17 2024     2024    K 5.1 2024     2024    CO2 31 2024        Lab Results   Component Value Date    SHAHNAZ NEGATIVE 10/01/2019    RF 11 10/01/2018    SEDRATE 7 2019        IgE: No results found for: \"IGE\"   Respiratory Allergy Panel:  AEC:   Eosinophils Absolute (x10*3/uL)   Date Value   2024 0.40     Eosinophils % (%)   Date Value   2024 4.9       Cultures:  No results found for: \"AFBCX\"   No results found for: \"RESPCULTCYFI\"    No results found for the last 90 days.  C     Assessment and Plan        1. Severe COPD: GOLD 3E COPD--appears to have some environmental trigger--may be related to an exposures at his son's house in [place] or a virus he may have picked up while traveling/from [employer].  Reports worsening symptoms difficulty working at [employer].  He is currently enrolled in [facility] virtual rehab program   - continue Trelegy 200 daily  - continue duoneb prn   - Continue albuterol HFA PRN  - some temporality with staying in [place] and his symptoms (family was also having symptoms)--they since moved out of the house.  He is stil having dyspnea symptoms but no longer the bronchitis " anjel   -suggested LVRS referral given hyperinflation on pft and emphysema --we briefly spoke about this, pt would like to see how he does after completing cardia program to see if this improves his breathing first   -Since his family is moving out of the house in [place], will see if he has any further exacerbations   -we can consider dupixent vs roflumilast if he is having recurrent exacerbations--not having enough exacerbations---was most likely related to exposure in [place]           Lung Nodules  -most recent scan 9/2024 with stable 4mm lung nodules  -recommend annual LDCT chest for lung cancer screening--next due in September 2025        Follow up after completion of cardia rehab or sooner if needed --~ 4 months             Xiomara Guevara MD  08/05/2025

## 2025-08-05 NOTE — PATIENT INSTRUCTIONS
Thank you for visiting the Pulmonary Clinic today.   Your breathing medications: continue trelegy, continue as needed albuterol   Will discuss spiration valve for the emphysema   Tests: CT scan September   Return in 4 months or sooner if needed   If you have questions or concerns, call [phone] (option 4)

## 2025-08-06 DIAGNOSIS — M05.79 RHEUMATOID ARTHRITIS INVOLVING MULTIPLE SITES WITH POSITIVE RHEUMATOID FACTOR (MULTI): Primary | ICD-10-CM

## 2025-08-06 DIAGNOSIS — G89.29 CHRONIC BACK PAIN, UNSPECIFIED BACK LOCATION, UNSPECIFIED BACK PAIN LATERALITY: ICD-10-CM

## 2025-08-06 DIAGNOSIS — M54.9 CHRONIC BACK PAIN, UNSPECIFIED BACK LOCATION, UNSPECIFIED BACK PAIN LATERALITY: ICD-10-CM

## 2025-08-22 ENCOUNTER — PATIENT MESSAGE (OUTPATIENT)
Dept: PRIMARY CARE | Facility: CLINIC | Age: 70
End: 2025-08-22
Payer: MEDICARE

## 2025-08-22 DIAGNOSIS — R13.10 DYSPHAGIA, UNSPECIFIED TYPE: ICD-10-CM

## 2025-08-22 DIAGNOSIS — K20.90 ESOPHAGITIS: ICD-10-CM

## 2025-08-24 RX ORDER — PANTOPRAZOLE SODIUM 40 MG/1
40 TABLET, DELAYED RELEASE ORAL
Qty: 90 TABLET | Refills: 3 | Status: SHIPPED | OUTPATIENT
Start: 2025-08-24 | End: 2026-08-24

## 2025-08-25 ENCOUNTER — HOSPITAL ENCOUNTER (OUTPATIENT)
Dept: RESPIRATORY THERAPY | Facility: HOSPITAL | Age: 70
Discharge: HOME | End: 2025-08-25
Payer: MEDICARE

## 2025-08-25 ENCOUNTER — APPOINTMENT (OUTPATIENT)
Dept: RADIOLOGY | Facility: HOSPITAL | Age: 70
End: 2025-08-25
Payer: MEDICARE

## 2025-08-25 DIAGNOSIS — Z87.891 HISTORY OF NICOTINE DEPENDENCE: ICD-10-CM

## 2025-08-25 DIAGNOSIS — J43.2 CENTRILOBULAR EMPHYSEMA (MULTI): ICD-10-CM

## 2025-08-25 PROCEDURE — 94618 PULMONARY STRESS TESTING: CPT | Performed by: INTERNAL MEDICINE

## 2025-08-25 PROCEDURE — 2500000001 HC RX 250 WO HCPCS SELF ADMINISTERED DRUGS (ALT 637 FOR MEDICARE OP): Performed by: STUDENT IN AN ORGANIZED HEALTH CARE EDUCATION/TRAINING PROGRAM

## 2025-08-25 PROCEDURE — 94060 EVALUATION OF WHEEZING: CPT | Performed by: INTERNAL MEDICINE

## 2025-08-25 PROCEDURE — 94729 DIFFUSING CAPACITY: CPT | Performed by: INTERNAL MEDICINE

## 2025-08-25 PROCEDURE — 71250 CT THORAX DX C-: CPT | Performed by: RADIOLOGY

## 2025-08-25 PROCEDURE — 71250 CT THORAX DX C-: CPT

## 2025-08-25 PROCEDURE — 94726 PLETHYSMOGRAPHY LUNG VOLUMES: CPT

## 2025-08-25 PROCEDURE — 94726 PLETHYSMOGRAPHY LUNG VOLUMES: CPT | Performed by: INTERNAL MEDICINE

## 2025-08-25 RX ORDER — ALBUTEROL SULFATE 90 UG/1
4 INHALANT RESPIRATORY (INHALATION) ONCE
Status: COMPLETED | OUTPATIENT
Start: 2025-08-25 | End: 2025-08-25

## 2025-08-25 RX ORDER — PANTOPRAZOLE SODIUM 40 MG/1
40 TABLET, DELAYED RELEASE ORAL 2 TIMES DAILY
Qty: 60 TABLET | Refills: 1 | Status: SHIPPED | OUTPATIENT
Start: 2025-08-25

## 2025-08-25 RX ADMIN — ALBUTEROL SULFATE 4 PUFF: 90 AEROSOL, METERED RESPIRATORY (INHALATION) at 10:46

## 2025-08-27 ENCOUNTER — TELEPHONE (OUTPATIENT)
Dept: PULMONOLOGY | Facility: HOSPITAL | Age: 70
End: 2025-08-27
Payer: MEDICARE

## 2025-08-27 LAB
MGC ASCENT PFT - FEV1 - POST: 0.75
MGC ASCENT PFT - FEV1 - PRE: 0.68
MGC ASCENT PFT - FEV1 - PREDICTED: 2.62
MGC ASCENT PFT - FVC - POST: 2.83
MGC ASCENT PFT - FVC - PRE: 2.85
MGC ASCENT PFT - FVC - PREDICTED: 3.43

## 2025-08-29 ENCOUNTER — TELEPHONE (OUTPATIENT)
Dept: PULMONOLOGY | Facility: HOSPITAL | Age: 70
End: 2025-08-29
Payer: MEDICARE

## 2025-09-18 ENCOUNTER — APPOINTMENT (OUTPATIENT)
Dept: RADIOLOGY | Facility: CLINIC | Age: 70
End: 2025-09-18
Payer: MEDICARE

## 2025-09-25 ENCOUNTER — APPOINTMENT (OUTPATIENT)
Dept: RADIOLOGY | Facility: HOSPITAL | Age: 70
End: 2025-09-25
Payer: MEDICARE

## 2025-12-04 ENCOUNTER — APPOINTMENT (OUTPATIENT)
Dept: PRIMARY CARE | Facility: CLINIC | Age: 70
End: 2025-12-04
Payer: MEDICARE

## 2025-12-23 ENCOUNTER — APPOINTMENT (OUTPATIENT)
Dept: OPHTHALMOLOGY | Facility: CLINIC | Age: 70
End: 2025-12-23
Payer: MEDICARE

## 2026-01-29 ENCOUNTER — APPOINTMENT (OUTPATIENT)
Dept: PULMONOLOGY | Facility: CLINIC | Age: 71
End: 2026-01-29
Payer: MEDICARE